# Patient Record
Sex: FEMALE | Race: WHITE | Employment: PART TIME | URBAN - METROPOLITAN AREA
[De-identification: names, ages, dates, MRNs, and addresses within clinical notes are randomized per-mention and may not be internally consistent; named-entity substitution may affect disease eponyms.]

---

## 2017-09-09 ENCOUNTER — HOSPITAL ENCOUNTER (EMERGENCY)
Age: 42
Discharge: HOME | End: 2017-09-09
Attending: EMERGENCY MEDICINE
Payer: SELF-PAY

## 2017-09-09 ENCOUNTER — APPOINTMENT (OUTPATIENT)
Dept: GENERAL RADIOLOGY | Age: 42
End: 2017-09-09
Attending: NURSE PRACTITIONER
Payer: SELF-PAY

## 2017-09-09 VITALS
HEART RATE: 108 BPM | DIASTOLIC BLOOD PRESSURE: 83 MMHG | RESPIRATION RATE: 16 BRPM | SYSTOLIC BLOOD PRESSURE: 135 MMHG | OXYGEN SATURATION: 98 % | WEIGHT: 145 LBS | TEMPERATURE: 98.2 F | BODY MASS INDEX: 30.44 KG/M2 | HEIGHT: 58 IN

## 2017-09-09 DIAGNOSIS — R07.2 SUBSTERNAL CHEST PAIN: Primary | ICD-10-CM

## 2017-09-09 DIAGNOSIS — N30.00 ACUTE CYSTITIS WITHOUT HEMATURIA: ICD-10-CM

## 2017-09-09 LAB
ALBUMIN SERPL-MCNC: 3.7 G/DL (ref 3.5–5)
ALBUMIN/GLOB SERPL: 0.8 {RATIO} (ref 1.2–3.5)
ALP SERPL-CCNC: 80 U/L (ref 50–136)
ALT SERPL-CCNC: 29 U/L (ref 12–65)
AMORPH CRY URNS QL MICRO: ABNORMAL
ANION GAP SERPL CALC-SCNC: 12 MMOL/L (ref 7–16)
AST SERPL-CCNC: 25 U/L (ref 15–37)
BACTERIA URNS QL MICRO: ABNORMAL /HPF
BASOPHILS # BLD: 0.1 K/UL (ref 0–0.2)
BASOPHILS NFR BLD: 0 % (ref 0–2)
BILIRUB SERPL-MCNC: 0.5 MG/DL (ref 0.2–1.1)
BUN SERPL-MCNC: 14 MG/DL (ref 6–23)
CALCIUM SERPL-MCNC: 8.8 MG/DL (ref 8.3–10.4)
CASTS URNS QL MICRO: ABNORMAL /LPF
CHLORIDE SERPL-SCNC: 105 MMOL/L (ref 98–107)
CO2 SERPL-SCNC: 21 MMOL/L (ref 21–32)
CREAT SERPL-MCNC: 0.92 MG/DL (ref 0.6–1)
CRYSTALS URNS QL MICRO: ABNORMAL /LPF
D DIMER PPP FEU-MCNC: 0.2 UG/ML(FEU)
DIFFERENTIAL METHOD BLD: ABNORMAL
EOSINOPHIL # BLD: 0.1 K/UL (ref 0–0.8)
EOSINOPHIL NFR BLD: 1 % (ref 0.5–7.8)
EPI CELLS #/AREA URNS HPF: ABNORMAL /HPF
ERYTHROCYTE [DISTWIDTH] IN BLOOD BY AUTOMATED COUNT: 12.5 % (ref 11.9–14.6)
GLOBULIN SER CALC-MCNC: 4.9 G/DL (ref 2.3–3.5)
GLUCOSE SERPL-MCNC: 140 MG/DL (ref 65–100)
HCT VFR BLD AUTO: 42.8 % (ref 35.8–46.3)
HGB BLD-MCNC: 15.5 G/DL (ref 11.7–15.4)
IMM GRANULOCYTES # BLD: 0 K/UL (ref 0–0.5)
IMM GRANULOCYTES NFR BLD: 0.2 % (ref 0–5)
LYMPHOCYTES # BLD: 3.6 K/UL (ref 0.5–4.6)
LYMPHOCYTES NFR BLD: 32 % (ref 13–44)
MCH RBC QN AUTO: 30.2 PG (ref 26.1–32.9)
MCHC RBC AUTO-ENTMCNC: 36.2 G/DL (ref 31.4–35)
MCV RBC AUTO: 83.4 FL (ref 79.6–97.8)
MONOCYTES # BLD: 0.7 K/UL (ref 0.1–1.3)
MONOCYTES NFR BLD: 6 % (ref 4–12)
MUCOUS THREADS URNS QL MICRO: 0 /LPF
NEUTS SEG # BLD: 7 K/UL (ref 1.7–8.2)
NEUTS SEG NFR BLD: 61 % (ref 43–78)
PLATELET # BLD AUTO: 332 K/UL (ref 150–450)
PMV BLD AUTO: 10 FL (ref 10.8–14.1)
POTASSIUM SERPL-SCNC: 3.6 MMOL/L (ref 3.5–5.1)
PROT SERPL-MCNC: 8.6 G/DL (ref 6.3–8.2)
RBC # BLD AUTO: 5.13 M/UL (ref 4.05–5.25)
RBC #/AREA URNS HPF: 0 /HPF
SODIUM SERPL-SCNC: 138 MMOL/L (ref 136–145)
TROPONIN I BLD-MCNC: 0 NG/ML (ref 0.02–0.05)
TROPONIN I SERPL-MCNC: <0.02 NG/ML (ref 0.02–0.05)
WBC # BLD AUTO: 11.4 K/UL (ref 4.3–11.1)
WBC URNS QL MICRO: ABNORMAL /HPF

## 2017-09-09 PROCEDURE — 99285 EMERGENCY DEPT VISIT HI MDM: CPT | Performed by: NURSE PRACTITIONER

## 2017-09-09 PROCEDURE — 81015 MICROSCOPIC EXAM OF URINE: CPT | Performed by: EMERGENCY MEDICINE

## 2017-09-09 PROCEDURE — 74011250637 HC RX REV CODE- 250/637: Performed by: EMERGENCY MEDICINE

## 2017-09-09 PROCEDURE — 85379 FIBRIN DEGRADATION QUANT: CPT | Performed by: NURSE PRACTITIONER

## 2017-09-09 PROCEDURE — 71020 XR CHEST PA LAT: CPT

## 2017-09-09 PROCEDURE — 93005 ELECTROCARDIOGRAM TRACING: CPT | Performed by: NURSE PRACTITIONER

## 2017-09-09 PROCEDURE — 81003 URINALYSIS AUTO W/O SCOPE: CPT | Performed by: NURSE PRACTITIONER

## 2017-09-09 PROCEDURE — 84484 ASSAY OF TROPONIN QUANT: CPT | Performed by: NURSE PRACTITIONER

## 2017-09-09 PROCEDURE — 80053 COMPREHEN METABOLIC PANEL: CPT | Performed by: NURSE PRACTITIONER

## 2017-09-09 PROCEDURE — 85025 COMPLETE CBC W/AUTO DIFF WBC: CPT | Performed by: NURSE PRACTITIONER

## 2017-09-09 RX ORDER — SULFAMETHOXAZOLE AND TRIMETHOPRIM 800; 160 MG/1; MG/1
1 TABLET ORAL 2 TIMES DAILY
Qty: 6 TAB | Refills: 0 | Status: SHIPPED | OUTPATIENT
Start: 2017-09-09 | End: 2017-09-12

## 2017-09-09 RX ADMIN — Medication 30 ML: at 18:46

## 2017-09-09 NOTE — ED PROVIDER NOTES
HPI Comments: 57-year-old female with history of hypertension presents with complaint of dull, aching substernal chest pain w/o radiation that began around 11 AM. Denies shortness of breath, nausea, vomiting, dizziness, weakness, fever, chills, cough, LE edema/pain, hemoptysis. Reports positive family history for CAD. States that she underwent cardiac stress tests and GHS 5-6 years ago that was within normal limits. States she has an allergy to aspirin and cannot take at this time. Patient is a 38 y.o. female presenting with chest pain. The history is provided by the patient. No  was used. Chest Pain (Angina)    This is a new problem. The current episode started 6 to 12 hours ago. The problem has been resolved. The problem occurs constantly. The pain is associated with exertion. The pain is present in the substernal region. The pain is at a severity of 2/10. The pain is mild. The quality of the pain is described as sharp. The pain radiates to the left shoulder. Associated symptoms include nausea. Pertinent negatives include no abdominal pain, no back pain, no claudication, no cough, no diaphoresis, no dizziness, no exertional chest pressure, no fever, no headaches, no hemoptysis, no leg pain, no lower extremity edema, no malaise/fatigue, no near-syncope, no numbness, no orthopnea, no palpitations, no shortness of breath, no vomiting and no weakness. She has tried nothing for the symptoms. The treatment provided no relief. Risk factors include family history, diabetes mellitus and hypertension. Her past medical history is significant for HTN. Her past medical history does not include DM, DVT or PE. Procedural history includes exercise treadmill test.       Past Medical History:   Diagnosis Date    Hypertension        Past Surgical History:   Procedure Laterality Date    HX GYN      c-sec x2; hysterectomy         History reviewed. No pertinent family history.     Social History     Social History    Marital status:      Spouse name: N/A    Number of children: N/A    Years of education: N/A     Occupational History    Not on file. Social History Main Topics    Smoking status: Never Smoker    Smokeless tobacco: Never Used    Alcohol use No    Drug use: No    Sexual activity: Yes     Birth control/ protection: Surgical     Other Topics Concern    Not on file     Social History Narrative         ALLERGIES: Aspirin    Review of Systems   Constitutional: Negative for chills, diaphoresis, fatigue, fever and malaise/fatigue. HENT: Negative for congestion and rhinorrhea. Eyes: Negative for pain and redness. Respiratory: Negative for cough, hemoptysis and shortness of breath. Cardiovascular: Positive for chest pain. Negative for palpitations, orthopnea, claudication, leg swelling and near-syncope. Gastrointestinal: Positive for nausea. Negative for abdominal distention, abdominal pain, anal bleeding, blood in stool, diarrhea and vomiting. Endocrine: Negative for polydipsia and polyphagia. Genitourinary: Negative for dysuria, flank pain, genital sores, hematuria, pelvic pain, vaginal bleeding and vaginal discharge. Musculoskeletal: Negative for back pain, gait problem, joint swelling, neck pain and neck stiffness. Skin: Negative for pallor and rash. Neurological: Negative for dizziness, seizures, syncope, weakness, numbness and headaches. Psychiatric/Behavioral: Negative for agitation and confusion. Vitals:    09/09/17 1556 09/09/17 1712 09/09/17 1741   BP: (!) 174/112 136/75 137/89   Pulse: (!) 108     Resp: 26     Temp: 98 °F (36.7 °C)     SpO2: 97% 98% 99%   Weight: 65.8 kg (145 lb)     Height: 4' 10\" (1.473 m)              Physical Exam   Constitutional: She is oriented to person, place, and time. She appears well-developed and well-nourished. No distress. HENT:   Head: Normocephalic and atraumatic.    Mouth/Throat: Oropharynx is clear and moist. No oropharyngeal exudate. Eyes: Conjunctivae and EOM are normal. Pupils are equal, round, and reactive to light. Neck: Normal range of motion. No JVD present. No tracheal deviation present. Cardiovascular: Normal rate, regular rhythm and intact distal pulses. No murmur heard. Radial pulses 2+ and equal bilaterally    Pulmonary/Chest: Effort normal and breath sounds normal. No respiratory distress. She has no wheezes. She has no rales. She exhibits no tenderness. Abdominal: Soft. Bowel sounds are normal. She exhibits no distension and no mass. There is no tenderness. There is no rebound and no guarding. Musculoskeletal: Normal range of motion. She exhibits no edema, tenderness or deformity. No calf tenderness present bilaterally   Neurological: She is alert and oriented to person, place, and time. No cranial nerve deficit. Coordination normal.   Skin: Skin is warm and dry. No rash noted. No erythema. No pallor. Psychiatric: She has a normal mood and affect. Her behavior is normal.   Nursing note and vitals reviewed. MDM  Number of Diagnoses or Management Options  Substernal chest pain: new and requires workup     Amount and/or Complexity of Data Reviewed  Clinical lab tests: ordered and reviewed  Tests in the radiology section of CPT®: ordered and reviewed  Tests in the medicine section of CPT®: ordered and reviewed  Review and summarize past medical records: yes  Discuss the patient with other providers: yes  Independent visualization of images, tracings, or specimens: yes    Risk of Complications, Morbidity, and/or Mortality  Presenting problems: moderate  Diagnostic procedures: moderate  Management options: moderate      ED Course   Comment By Time   CXR IMPRESSION:  1. No acute abnormality. Jared Howard MD 09/09 1726   Vital signs stable. Initial and repeat troponin within normal limits. D-dimer within normal limits. EKG with no acute findings. chest x-ray clear.  We'll discharge with close follow with cardiology. Will contact the [facility] cardiology referral line to have patient seen within the next week. Instructed patient to take aspirin daily.  Yamilex Sorto MD 09/09 1931       EKG  Date/Time: 9/9/2017 5:32 PM  Performed by: Donnel Collet  Authorized by: KALE Gaspar     Rate:     ECG rate:  105    ECG rate assessment: tachycardic    Rhythm:     Rhythm: sinus tachycardia    Ectopy:     Ectopy: none    QRS:     QRS axis:  Normal    QRS intervals:  Normal  Conduction:     Conduction: normal    ST segments:     ST segments:  Normal  T waves:     T waves: normal

## 2017-09-09 NOTE — ED TRIAGE NOTES
Roc Plaza is a 38 y.o. female here for chest pain that radiates into her left arm. She states pain woke her this morning around 0430. She states she though she had heart burn but burning sensation has stopped and now she just has chest pain. She states some nausea but denies vomiting. She states feels like she cannot get a deep breath. She is currently crying while in triage. . Rapid assessment performed. --- Orders were placed.     Signed By: UMM Ma     September 9, 2017

## 2017-09-09 NOTE — LETTER
[address] [facility] EMERGENCY DEPT [address] [address] 
[phone] Work/School Note Date: 9/9/2017 To Whom It May concern: 
 
Leena Galeano was seen and treated today in the emergency room by the following provider(s): 
Attending Provider: Jose Damon MD.   
 
Leena Galeano may return to work on 09/10/2017. Sincerely, Vicky Montiel RN

## 2017-09-09 NOTE — ED TRIAGE NOTES
Pt states chest pain that woke her up this morning along with her left shoulder. Pt states when she breaths in she feels as if she cannot get a full breath.

## 2017-09-09 NOTE — DISCHARGE INSTRUCTIONS
Urinary Tract Infection in Women: Care Instructions  Your Care Instructions    A urinary tract infection, or UTI, is a general term for an infection anywhere between the kidneys and the urethra (where urine comes out). Most UTIs are bladder infections. They often cause pain or burning when you urinate. UTIs are caused by bacteria and can be cured with antibiotics. Be sure to complete your treatment so that the infection goes away. Follow-up care is a key part of your treatment and safety. Be sure to make and go to all appointments, and call your doctor if you are having problems. It's also a good idea to know your test results and keep a list of the medicines you take. How can you care for yourself at home? · Take your antibiotics as directed. Do not stop taking them just because you feel better. You need to take the full course of antibiotics. · Drink extra water and other fluids for the next day or two. This may help wash out the bacteria that are causing the infection. (If you have kidney, heart, or liver disease and have to limit fluids, talk with your doctor before you increase your fluid intake.)  · Avoid drinks that are carbonated or have caffeine. They can irritate the bladder. · Urinate often. Try to empty your bladder each time. · To relieve pain, take a hot bath or lay a heating pad set on low over your lower belly or genital area. Never go to sleep with a heating pad in place. To prevent UTIs  · Drink plenty of water each day. This helps you urinate often, which clears bacteria from your system. (If you have kidney, heart, or liver disease and have to limit fluids, talk with your doctor before you increase your fluid intake.)  · Urinate when you need to. · Urinate right after you have sex. · Change sanitary pads often. · Avoid douches, bubble baths, feminine hygiene sprays, and other feminine hygiene products that have deodorants.   · After going to the bathroom, wipe from front to back.  When should you call for help? Call your doctor now or seek immediate medical care if:  · Symptoms such as fever, chills, nausea, or vomiting get worse or appear for the first time. · You have new pain in your back just below your rib cage. This is called flank pain. · There is new blood or pus in your urine. · You have any problems with your antibiotic medicine. Watch closely for changes in your health, and be sure to contact your doctor if:  · You are not getting better after taking an antibiotic for 2 days. · Your symptoms go away but then come back. Where can you learn more? Go to http://cassandra-anna.info/. Enter W366 in the search box to learn more about \"Urinary Tract Infection in Women: Care Instructions. \"  Current as of: November 28, 2016  Content Version: 11.3  © 7310-4100 [employer]. Care instructions adapted under license by [employer] (which disclaims liability or warranty for this information). If you have questions about a medical condition or this instruction, always ask your healthcare professional. [address] any warranty or liability for your use of this information. Chest Pain: Care Instructions  Your Care Instructions  There are many things that can cause chest pain. Some are not serious and will get better on their own in a few days. But some kinds of chest pain need more testing and treatment. Your doctor may have recommended a follow-up visit in the next 8 to 12 hours. If you are not getting better, you may need more tests or treatment. Even though your doctor has released you, you still need to watch for any problems. The doctor carefully checked you, but sometimes problems can develop later. If you have new symptoms or if your symptoms do not get better, get medical care right away.   If you have worse or different chest pain or pressure that lasts more than 5 minutes or you passed out (lost consciousness), call 911 or seek other emergency help right away. A medical visit is only one step in your treatment. Even if you feel better, you still need to do what your doctor recommends, such as going to all suggested follow-up appointments and taking medicines exactly as directed. This will help you recover and help prevent future problems. How can you care for yourself at home? · Rest until you feel better. · Take your medicine exactly as prescribed. Call your doctor if you think you are having a problem with your medicine. · Do not drive after taking a prescription pain medicine. When should you call for help? Call 911 if:  · You passed out (lost consciousness). · You have severe difficulty breathing. · You have symptoms of a heart attack. These may include:  ¨ Chest pain or pressure, or a strange feeling in your chest.  ¨ Sweating. ¨ Shortness of breath. ¨ Nausea or vomiting. ¨ Pain, pressure, or a strange feeling in your back, neck, jaw, or upper belly or in one or both shoulders or arms. ¨ Lightheadedness or sudden weakness. ¨ A fast or irregular heartbeat. After you call 911, the  may tell you to chew 1 adult-strength or 2 to 4 low-dose aspirin. Wait for an ambulance. Do not try to drive yourself. Call your doctor today if:  · You have any trouble breathing. · Your chest pain gets worse. · You are dizzy or lightheaded, or you feel like you may faint. · You are not getting better as expected. · You are having new or different chest pain. Where can you learn more? Go to http://cassandra-anna.info/. Enter A120 in the search box to learn more about \"Chest Pain: Care Instructions. \"  Current as of: March 20, 2017  Content Version: 11.3  © 5550-0927 [employer]. Care instructions adapted under license by [employer] (which disclaims liability or warranty for this information).  If you have questions about a medical condition or this instruction, always ask your healthcare professional. [place] [address] any warranty or liability for your use of this information.

## 2017-09-10 LAB
ATRIAL RATE: 105 BPM
CALCULATED P AXIS, ECG09: 40 DEGREES
CALCULATED R AXIS, ECG10: 21 DEGREES
CALCULATED T AXIS, ECG11: 37 DEGREES
DIAGNOSIS, 93000: NORMAL
P-R INTERVAL, ECG05: 132 MS
Q-T INTERVAL, ECG07: 324 MS
QRS DURATION, ECG06: 72 MS
QTC CALCULATION (BEZET), ECG08: 428 MS
VENTRICULAR RATE, ECG03: 105 BPM

## 2017-10-26 ENCOUNTER — HOSPITAL ENCOUNTER (INPATIENT)
Age: 42
LOS: 2 days | Discharge: HOME | DRG: 872 | End: 2017-10-28
Attending: EMERGENCY MEDICINE | Admitting: INTERNAL MEDICINE
Payer: SELF-PAY

## 2017-10-26 ENCOUNTER — APPOINTMENT (OUTPATIENT)
Dept: GENERAL RADIOLOGY | Age: 42
DRG: 872 | End: 2017-10-26
Attending: EMERGENCY MEDICINE
Payer: SELF-PAY

## 2017-10-26 DIAGNOSIS — J20.9 ACUTE BRONCHITIS, UNSPECIFIED ORGANISM: ICD-10-CM

## 2017-10-26 DIAGNOSIS — A41.9 SEPSIS, DUE TO UNSPECIFIED ORGANISM: Primary | ICD-10-CM

## 2017-10-26 LAB
ALBUMIN SERPL-MCNC: 3.3 G/DL (ref 3.5–5)
ALBUMIN/GLOB SERPL: 0.7 {RATIO} (ref 1.2–3.5)
ALP SERPL-CCNC: 96 U/L (ref 50–136)
ALT SERPL-CCNC: 39 U/L (ref 12–65)
ANION GAP SERPL CALC-SCNC: 12 MMOL/L (ref 7–16)
APPEARANCE UR: CLEAR
AST SERPL-CCNC: 41 U/L (ref 15–37)
BACTERIA URNS QL MICRO: ABNORMAL /HPF
BACTERIA URNS QL MICRO: ABNORMAL /HPF
BASOPHILS # BLD: 0 K/UL (ref 0–0.2)
BASOPHILS NFR BLD: 0 % (ref 0–2)
BILIRUB SERPL-MCNC: 0.9 MG/DL (ref 0.2–1.1)
BILIRUB UR QL: NEGATIVE
BUN SERPL-MCNC: 8 MG/DL (ref 6–23)
CALCIUM SERPL-MCNC: 8.6 MG/DL (ref 8.3–10.4)
CASTS URNS QL MICRO: 0 /LPF
CASTS URNS QL MICRO: ABNORMAL /LPF
CHLORIDE SERPL-SCNC: 99 MMOL/L (ref 98–107)
CO2 SERPL-SCNC: 23 MMOL/L (ref 21–32)
COLOR UR: YELLOW
CREAT SERPL-MCNC: 0.9 MG/DL (ref 0.6–1)
CRYSTALS URNS QL MICRO: 0 /LPF
DEPRECATED S PYO AG THROAT QL EIA: NEGATIVE
DIFFERENTIAL METHOD BLD: ABNORMAL
EOSINOPHIL # BLD: 0 K/UL (ref 0–0.8)
EOSINOPHIL NFR BLD: 0 % (ref 0.5–7.8)
EPI CELLS #/AREA URNS HPF: ABNORMAL /HPF
EPI CELLS #/AREA URNS HPF: ABNORMAL /HPF
ERYTHROCYTE [DISTWIDTH] IN BLOOD BY AUTOMATED COUNT: 12.6 % (ref 11.9–14.6)
FLUAV AG NPH QL IA: NEGATIVE
FLUBV AG NPH QL IA: NEGATIVE
GLOBULIN SER CALC-MCNC: 4.9 G/DL (ref 2.3–3.5)
GLUCOSE SERPL-MCNC: 214 MG/DL (ref 65–100)
GLUCOSE UR STRIP.AUTO-MCNC: >1000 MG/DL
HCT VFR BLD AUTO: 41.3 % (ref 35.8–46.3)
HGB BLD-MCNC: 14.4 G/DL (ref 11.7–15.4)
HGB UR QL STRIP: ABNORMAL
IMM GRANULOCYTES # BLD: 0.1 K/UL (ref 0–0.5)
IMM GRANULOCYTES NFR BLD: 1 % (ref 0–5)
KETONES UR QL STRIP.AUTO: >80 MG/DL
LACTATE BLD-SCNC: 2.4 MMOL/L (ref 0.5–1.9)
LACTATE SERPL-SCNC: 2.5 MMOL/L (ref 0.4–2)
LEUKOCYTE ESTERASE UR QL STRIP.AUTO: NEGATIVE
LYMPHOCYTES # BLD: 1.5 K/UL (ref 0.5–4.6)
LYMPHOCYTES NFR BLD: 10 % (ref 13–44)
MAGNESIUM SERPL-MCNC: 2 MG/DL (ref 1.8–2.4)
MCH RBC QN AUTO: 30 PG (ref 26.1–32.9)
MCHC RBC AUTO-ENTMCNC: 34.9 G/DL (ref 31.4–35)
MCV RBC AUTO: 86 FL (ref 79.6–97.8)
MONOCYTES # BLD: 0.8 K/UL (ref 0.1–1.3)
MONOCYTES NFR BLD: 5 % (ref 4–12)
MUCOUS THREADS URNS QL MICRO: ABNORMAL /LPF
NEUTS SEG # BLD: 12.9 K/UL (ref 1.7–8.2)
NEUTS SEG NFR BLD: 84 % (ref 43–78)
NITRITE UR QL STRIP.AUTO: NEGATIVE
OTHER OBSERVATIONS,UCOM: ABNORMAL
PH UR STRIP: 5.5 [PH] (ref 5–9)
PHOSPHATE SERPL-MCNC: 1.8 MG/DL (ref 2.5–4.5)
PLATELET # BLD AUTO: 245 K/UL (ref 150–450)
PMV BLD AUTO: 10.1 FL (ref 10.8–14.1)
POTASSIUM SERPL-SCNC: 3.3 MMOL/L (ref 3.5–5.1)
PROCALCITONIN SERPL-MCNC: 0.3 NG/ML
PROT SERPL-MCNC: 8.2 G/DL (ref 6.3–8.2)
PROT UR STRIP-MCNC: ABNORMAL MG/DL
RBC # BLD AUTO: 4.8 M/UL (ref 4.05–5.25)
RBC #/AREA URNS HPF: ABNORMAL /HPF
RBC #/AREA URNS HPF: ABNORMAL /HPF
SODIUM SERPL-SCNC: 134 MMOL/L (ref 136–145)
SP GR UR REFRACTOMETRY: 1.04 (ref 1–1.02)
UROBILINOGEN UR QL STRIP.AUTO: 0.2 EU/DL (ref 0.2–1)
WBC # BLD AUTO: 15.3 K/UL (ref 4.3–11.1)
WBC URNS QL MICRO: ABNORMAL /HPF
WBC URNS QL MICRO: ABNORMAL /HPF

## 2017-10-26 PROCEDURE — 83605 ASSAY OF LACTIC ACID: CPT | Performed by: INTERNAL MEDICINE

## 2017-10-26 PROCEDURE — 74011000250 HC RX REV CODE- 250

## 2017-10-26 PROCEDURE — 74011250636 HC RX REV CODE- 250/636: Performed by: INTERNAL MEDICINE

## 2017-10-26 PROCEDURE — 94640 AIRWAY INHALATION TREATMENT: CPT

## 2017-10-26 PROCEDURE — 65270000029 HC RM PRIVATE

## 2017-10-26 PROCEDURE — 71020 XR CHEST PA LAT: CPT

## 2017-10-26 PROCEDURE — 65660000000 HC RM CCU STEPDOWN

## 2017-10-26 PROCEDURE — 36415 COLL VENOUS BLD VENIPUNCTURE: CPT | Performed by: INTERNAL MEDICINE

## 2017-10-26 PROCEDURE — 93005 ELECTROCARDIOGRAM TRACING: CPT | Performed by: INTERNAL MEDICINE

## 2017-10-26 PROCEDURE — 74011000250 HC RX REV CODE- 250: Performed by: EMERGENCY MEDICINE

## 2017-10-26 PROCEDURE — 87880 STREP A ASSAY W/OPTIC: CPT | Performed by: EMERGENCY MEDICINE

## 2017-10-26 PROCEDURE — 87081 CULTURE SCREEN ONLY: CPT | Performed by: EMERGENCY MEDICINE

## 2017-10-26 PROCEDURE — 85025 COMPLETE CBC W/AUTO DIFF WBC: CPT | Performed by: EMERGENCY MEDICINE

## 2017-10-26 PROCEDURE — 96365 THER/PROPH/DIAG IV INF INIT: CPT | Performed by: EMERGENCY MEDICINE

## 2017-10-26 PROCEDURE — 96361 HYDRATE IV INFUSION ADD-ON: CPT | Performed by: EMERGENCY MEDICINE

## 2017-10-26 PROCEDURE — 83605 ASSAY OF LACTIC ACID: CPT

## 2017-10-26 PROCEDURE — 84145 PROCALCITONIN (PCT): CPT | Performed by: EMERGENCY MEDICINE

## 2017-10-26 PROCEDURE — 87086 URINE CULTURE/COLONY COUNT: CPT | Performed by: EMERGENCY MEDICINE

## 2017-10-26 PROCEDURE — 74011250637 HC RX REV CODE- 250/637: Performed by: EMERGENCY MEDICINE

## 2017-10-26 PROCEDURE — 81015 MICROSCOPIC EXAM OF URINE: CPT | Performed by: EMERGENCY MEDICINE

## 2017-10-26 PROCEDURE — 81001 URINALYSIS AUTO W/SCOPE: CPT | Performed by: INTERNAL MEDICINE

## 2017-10-26 PROCEDURE — 80053 COMPREHEN METABOLIC PANEL: CPT | Performed by: EMERGENCY MEDICINE

## 2017-10-26 PROCEDURE — 99285 EMERGENCY DEPT VISIT HI MDM: CPT | Performed by: EMERGENCY MEDICINE

## 2017-10-26 PROCEDURE — 96375 TX/PRO/DX INJ NEW DRUG ADDON: CPT | Performed by: EMERGENCY MEDICINE

## 2017-10-26 PROCEDURE — 87040 BLOOD CULTURE FOR BACTERIA: CPT | Performed by: EMERGENCY MEDICINE

## 2017-10-26 PROCEDURE — 74011250636 HC RX REV CODE- 250/636: Performed by: EMERGENCY MEDICINE

## 2017-10-26 PROCEDURE — 84100 ASSAY OF PHOSPHORUS: CPT | Performed by: INTERNAL MEDICINE

## 2017-10-26 PROCEDURE — 83735 ASSAY OF MAGNESIUM: CPT | Performed by: INTERNAL MEDICINE

## 2017-10-26 PROCEDURE — 87804 INFLUENZA ASSAY W/OPTIC: CPT | Performed by: EMERGENCY MEDICINE

## 2017-10-26 RX ORDER — DEXAMETHASONE SODIUM PHOSPHATE 100 MG/10ML
8 INJECTION INTRAMUSCULAR; INTRAVENOUS
Status: COMPLETED | OUTPATIENT
Start: 2017-10-26 | End: 2017-10-26

## 2017-10-26 RX ORDER — ACETAMINOPHEN 325 MG/1
650 TABLET ORAL
Status: DISCONTINUED | OUTPATIENT
Start: 2017-10-26 | End: 2017-10-28 | Stop reason: HOSPADM

## 2017-10-26 RX ORDER — ACETAMINOPHEN 500 MG
1000 TABLET ORAL
Status: COMPLETED | OUTPATIENT
Start: 2017-10-26 | End: 2017-10-26

## 2017-10-26 RX ORDER — SODIUM CHLORIDE 0.9 % (FLUSH) 0.9 %
5 SYRINGE (ML) INJECTION EVERY 8 HOURS
Status: DISCONTINUED | OUTPATIENT
Start: 2017-10-26 | End: 2017-10-28 | Stop reason: HOSPADM

## 2017-10-26 RX ORDER — SODIUM CHLORIDE 0.9 % (FLUSH) 0.9 %
5-10 SYRINGE (ML) INJECTION AS NEEDED
Status: DISCONTINUED | OUTPATIENT
Start: 2017-10-26 | End: 2017-10-28 | Stop reason: HOSPADM

## 2017-10-26 RX ORDER — ALBUTEROL SULFATE 0.83 MG/ML
5 SOLUTION RESPIRATORY (INHALATION)
Status: COMPLETED | OUTPATIENT
Start: 2017-10-26 | End: 2017-10-26

## 2017-10-26 RX ORDER — KETOROLAC TROMETHAMINE 30 MG/ML
30 INJECTION, SOLUTION INTRAMUSCULAR; INTRAVENOUS
Status: COMPLETED | OUTPATIENT
Start: 2017-10-26 | End: 2017-10-26

## 2017-10-26 RX ORDER — IPRATROPIUM BROMIDE AND ALBUTEROL SULFATE 2.5; .5 MG/3ML; MG/3ML
SOLUTION RESPIRATORY (INHALATION)
Status: COMPLETED
Start: 2017-10-26 | End: 2017-10-26

## 2017-10-26 RX ORDER — SODIUM CHLORIDE 9 MG/ML
100 INJECTION, SOLUTION INTRAVENOUS CONTINUOUS
Status: DISCONTINUED | OUTPATIENT
Start: 2017-10-26 | End: 2017-10-28 | Stop reason: HOSPADM

## 2017-10-26 RX ORDER — SODIUM CHLORIDE 9 MG/ML
500 INJECTION, SOLUTION INTRAVENOUS ONCE
Status: COMPLETED | OUTPATIENT
Start: 2017-10-26 | End: 2017-10-26

## 2017-10-26 RX ORDER — LEVOFLOXACIN 5 MG/ML
750 INJECTION, SOLUTION INTRAVENOUS
Status: COMPLETED | OUTPATIENT
Start: 2017-10-26 | End: 2017-10-26

## 2017-10-26 RX ORDER — ENOXAPARIN SODIUM 100 MG/ML
40 INJECTION SUBCUTANEOUS EVERY 24 HOURS
Status: DISCONTINUED | OUTPATIENT
Start: 2017-10-26 | End: 2017-10-28 | Stop reason: HOSPADM

## 2017-10-26 RX ADMIN — IPRATROPIUM BROMIDE AND ALBUTEROL SULFATE 3 ML: .5; 3 SOLUTION RESPIRATORY (INHALATION) at 15:30

## 2017-10-26 RX ADMIN — DEXAMETHASONE SODIUM PHOSPHATE 8 MG: 10 INJECTION INTRAMUSCULAR; INTRAVENOUS at 16:08

## 2017-10-26 RX ADMIN — LEVOFLOXACIN 750 MG: 5 INJECTION, SOLUTION INTRAVENOUS at 16:04

## 2017-10-26 RX ADMIN — SODIUM CHLORIDE 500 ML: 900 INJECTION, SOLUTION INTRAVENOUS at 17:19

## 2017-10-26 RX ADMIN — Medication 10 ML: at 22:45

## 2017-10-26 RX ADMIN — ALBUTEROL SULFATE 5 MG: 2.5 SOLUTION RESPIRATORY (INHALATION) at 15:30

## 2017-10-26 RX ADMIN — KETOROLAC TROMETHAMINE 30 MG: 30 INJECTION, SOLUTION INTRAMUSCULAR at 17:19

## 2017-10-26 RX ADMIN — SODIUM CHLORIDE 1905 ML: 900 INJECTION, SOLUTION INTRAVENOUS at 15:48

## 2017-10-26 RX ADMIN — ENOXAPARIN SODIUM 40 MG: 40 INJECTION SUBCUTANEOUS at 20:25

## 2017-10-26 RX ADMIN — ACETAMINOPHEN 1000 MG: 500 TABLET, FILM COATED ORAL at 13:15

## 2017-10-26 RX ADMIN — Medication 10 ML: at 20:29

## 2017-10-26 RX ADMIN — SODIUM CHLORIDE 150 ML/HR: 900 INJECTION, SOLUTION INTRAVENOUS at 20:26

## 2017-10-26 NOTE — IP AVS SNAPSHOT
Summary of Care Report The Summary of Care report has been created to help improve care coordination. Users with access to [employer] or [address] (Web-based application) may access additional patient information including the Discharge Summary. If you are not currently a [address] user and need more information, please call the number listed below in the [place] [address] section and ask to be connected with Medical Records. Facility Information Name Address Phone [address] [address] [address] [phone] Patient Information Patient Name Sex  Wellington Mandujano ([id number]) Female 1975 Discharge Information Admitting Provider Service Area Unit [facility] [address] / [address] Surgical / [phone] Discharge Provider Discharge Date/Time Discharge Disposition Destination (none) 10/28/2017 (Pending) AHR (none) Patient Language Language ENGLISH [13] Hospital Problems as of 10/28/2017  Never Reviewed Class Noted - Resolved Last Modified POA Active Problems DM type 2 (diabetes mellitus, type 2) ([facility] [address]  10/27/2017 - Present 10/27/2017 by Iman Dhaliwal MD Yes Entered by Iman Dhaliwal MD  
  Essential hypertension  10/27/2017 - Present 10/27/2017 by Iman Dhaliwal MD Yes Entered by Iamn Dhaliwal MD  
  Strep throat  10/28/2017 - Present 10/28/2017 by Iman Dhaliwal MD Yes Entered by Iman Dhaliwal MD  
  
You are allergic to the following Allergen Reactions Aspirin Other (comments) Unknown reaction Current Discharge Medication List  
  
START taking these medications Dose & Instructions Dispensing Information Comments  
 amoxicillin-clavulanate 500-125 mg per tablet Commonly known as:  AUGMENTIN Dose:  1 Tab Take 1 Tab by mouth every twelve (12) hours for 30 days. Quantity:  60 Tab Refills:  0 Blood-Glucose Meter monitoring kit Check blood glucose at least once a day, Quantity:  1 Kit Refills:  0  
   
 glucose blood VI test strips strip Commonly known as:  ASCENSIA AUTODISC VI, ONE TOUCH ULTRA TEST VI  
 1 strip daily Quantity:  50 Strip Refills:  0  
   
 lisinopril 10 mg tablet Commonly known as:  Rosana Castaneda Start taking on:  10/29/2017 Dose:  10 mg Take 1 Tab by mouth daily for 30 days. Quantity:  30 Tab Refills:  0  
   
 metFORMIN 1,000 mg tablet Commonly known as:  GLUCOPHAGE Dose:  1000 mg Take 1 Tab by mouth two (2) times daily (with meals) for 30 days. Quantity:  60 Tab Refills:  0 Current Immunizations Name Date Influenza Vaccine (Quad) PF 10/28/2017 Follow-up Information Follow up With Details Comments Contact Info Provider Unknown In 5 days follow up with PCP in 3-5 days Patient not available to ask Discharge Instructions DISCHARGE SUMMARY from Nurse PATIENT INSTRUCTIONS: 
 
 
F-face looks uneven A-arms unable to move or move unevenly S-speech slurred or non-existent T-time-call 911 as soon as signs and symptoms begin-DO NOT go Back to bed or wait to see if you get better-TIME IS BRAIN. Warning Signs of HEART ATTACK Call 911 if you have these symptoms: 
? Chest discomfort. Most heart attacks involve discomfort in the center of the chest that lasts more than a few minutes, or that goes away and comes back. It can feel like uncomfortable pressure, squeezing, fullness, or pain. ? Discomfort in other areas of the upper body. Symptoms can include pain or discomfort in one or both arms, the back, neck, jaw, or stomach. ? Shortness of breath with or without chest discomfort. ? Other signs may include breaking out in a cold sweat, nausea, or lightheadedness. Don't wait more than five minutes to call [address]! Fast action can save your life. Calling 911 is almost always the fastest way to get lifesaving treatment. Emergency Medical Services staff can begin treatment when they arrive  up to an hour sooner than if someone gets to the hospital by car. The discharge information has been reviewed with the patient. The patient verbalized understanding. Discharge medications reviewed with the patient and appropriate educational materials and side effects teaching were provided. Sepsis: Care Instructions Your Care Instructions Sepsis is an infection that has spread throughout your body. It is a life-threatening condition and often causes extremely low blood pressure. This can lead to problems with many different organs. The cause of sepsis is not always clear, but it can happen as part of a long-term or sudden illness. Sometimes even a mild illness can lead to sepsis. Follow-up care is a key part of your treatment and safety. Be sure to make and go to all appointments, and call your doctor if you are having problems. It's also a good idea to know your test results and keep a list of the medicines you take. How can you care for yourself at home? · If your doctor prescribed antibiotics, take them as directed. Do not stop taking them just because you feel better. You need to take the full course of antibiotics. · Drink plenty of fluids, enough so that your urine is light yellow or clear like water. Choose water or caffeine-free clear liquids until you feel better.  If you have kidney, heart, or liver disease and have to limit fluids, talk with your doctor before you increase your fluid intake. You can try rehydration drinks, such as Gatorade or Powerade. · Do not drink alcohol. · Eat a healthy diet. Include fruits, vegetables, and whole grains in your diet every day. · Walking is an easy way to get exercise. Gradually increase the amount you walk every day. Make sure your doctor knows that you are starting an exercise program. 
· Do not smoke or use other tobacco products. If you need help quitting, talk to your doctor about stop-smoking programs and medicines. These can increase your chances of quitting for good. When should you call for help? Call 911 anytime you think you may need emergency care. For example, call if: 
? · You passed out (lost consciousness). ?Call your doctor now or seek immediate medical care if: 
? · You have a fever or chills. ? · You have cool, pale, or clammy skin. ? · You are dizzy or lightheaded, or you feel like you may faint. ? · You have any new symptoms, such as a cough, pain in one part of your body, or urinary problems. ? Watch closely for changes in your health, and be sure to contact your doctor if: 
? · You do not get better as expected. Where can you learn more? Go to http://cassandra-anna.info/. Enter E827 in the search box to learn more about \"Sepsis: Care Instructions. \" Current as of: March 20, 2017 Content Version: 11.4 © 4980-2103 [employer]. Care instructions adapted under license by [employer] (which disclaims liability or warranty for this information). If you have questions about a medical condition or this instruction, always ask your healthcare professional. [place] [address] any warranty or liability for your use of this information. Strep Throat: Care Instructions Your Care Instructions Strep throat is a bacterial infection that causes sudden, severe sore throat and fever. Strep throat, which is caused by bacteria called streptococcus, is treated with antibiotics. Sometimes a strep test is necessary to tell if the sore throat is caused by strep bacteria. Treatment can help ease symptoms and may prevent future problems. Follow-up care is a key part of your treatment and safety. Be sure to make and go to all appointments, and call your doctor if you are having problems. It's also a good idea to know your test results and keep a list of the medicines you take. How can you care for yourself at home? · Take your antibiotics as directed. Do not stop taking them just because you feel better. You need to take the full course of antibiotics. · Strep throat can spread to others until 24 hours after you begin taking antibiotics. During this time, you should avoid contact with other people at work or home, especially infants and children. Do not sneeze or cough on others, and wash your hands often. Keep your drinking glass and eating utensils separate from those of others, and wash these items well in hot, soapy water. · Gargle with warm salt water at least once each hour to help reduce swelling and make your throat feel better. Use 1 teaspoon of salt mixed in 8 fluid ounces of warm water. · Take an over-the-counter pain medication, such as acetaminophen (Tylenol), ibuprofen (Advil, Motrin), or naproxen (Aleve). Read and follow all instructions on the label. · Try an over-the-counter anesthetic throat spray or throat lozenges, which may help relieve throat pain. · Drink plenty of fluids. Fluids may help soothe an irritated throat. Hot fluids, such as tea or soup, may help your throat feel better. · Eat soft solids and drink plenty of clear liquids. Flavored ice pops, ice cream, scrambled eggs, sherbet, and gelatin dessert (such as Jell-O) may also soothe the throat. · Get lots of rest. 
· Do not smoke, and avoid secondhand smoke.  If you need help quitting, talk to your doctor about stop-smoking programs and medicines. These can increase your chances of quitting for good. · Use a vaporizer or humidifier to add moisture to the air in your bedroom. Follow the directions for cleaning the machine. When should you call for help? Call your doctor now or seek immediate medical care if: 
? · You have a new or higher fever. ? · You have a fever with a stiff neck or severe headache. ? · You have new or worse trouble swallowing. ? · Your sore throat gets much worse on one side. ? · Your pain becomes much worse on one side of your throat. ? Watch closely for changes in your health, and be sure to contact your doctor if: 
? · You are not getting better after 2 days (48 hours). ? · You do not get better as expected. Where can you learn more? Go to http://cassandra-anna.info/. Enter K625 in the search box to learn more about \"Strep Throat: Care Instructions. \" Current as of: May 12, 2017 Content Version: 11.4 © 2059-3390 [employer]. Care instructions adapted under license by [employer] (which disclaims liability or warranty for this information). If you have questions about a medical condition or this instruction, always ask your healthcare professional. [address] any warranty or liability for your use of this information. ___________________________________________________________________________________________________________________________________ Chart Review Routing History No Routing History on File

## 2017-10-26 NOTE — ED NOTES
TRANSFER - OUT REPORT:    Verbal report given to Mana Stanton on Gae Peak  being transferred to [facility] for routine progression of care       Report consisted of patients Situation, Background, Assessment and   Recommendations(SBAR). Information from the following report(s) SBAR was reviewed with the receiving nurse. Lines:   Peripheral IV 10/26/17 Left Antecubital (Active)        Opportunity for questions and clarification was provided.

## 2017-10-26 NOTE — IP AVS SNAPSHOT
Merlin Car 
 
 
 [address] [facility] [address] [facility] 
[phone] Patient: Lynnette Arita MRN: [id number] PWS:[id number] My Medications TAKE these medications as instructed Instructions Each Dose to Equal  
 Morning Noon Evening Bedtime  
 amoxicillin-clavulanate 500-125 mg per tablet Commonly known as:  AUGMENTIN Your last dose was: Your next dose is: Take 1 Tab by mouth every twelve (12) hours for 30 days. 1 Tab Blood-Glucose Meter monitoring kit Your last dose was: Your next dose is:    
   
   
 Check blood glucose at least once a day,  
     
   
   
   
  
 glucose blood VI test strips strip Commonly known as:  ASCENSIA AUTODISC VI, ONE TOUCH ULTRA TEST VI Your last dose was: Your next dose is:    
   
   
 1 strip daily  
     
   
   
   
  
 lisinopril 10 mg tablet Commonly known as:  Ora Bee Start taking on:  10/29/2017 Your last dose was: Your next dose is: Take 1 Tab by mouth daily for 30 days. 10 mg  
    
   
   
   
  
 metFORMIN 1,000 mg tablet Commonly known as:  GLUCOPHAGE Your last dose was: Your next dose is: Take 1 Tab by mouth two (2) times daily (with meals) for 30 days. 1000 mg Where to Get Your Medications Information on where to get these meds will be given to you by the nurse or doctor. ! Ask your nurse or doctor about these medications  
  amoxicillin-clavulanate 500-125 mg per tablet Blood-Glucose Meter monitoring kit  
 glucose blood VI test strips strip  
 lisinopril 10 mg tablet  
 metFORMIN 1,000 mg tablet

## 2017-10-26 NOTE — ED TRIAGE NOTES
Patient reports being at work yesterday and legs began hurting. Patient reports having fever at home last night and taking tylenol without relief. Patient reports sore throat, wheezing, and feeling \"achy\" all over. Patient arrives alert, oriented x4, and ambulatory.

## 2017-10-26 NOTE — IP AVS SNAPSHOT
[address] 
[phone] Patient: Daija Argueta MRN: [id number] [facility]:4/83/[id number] About your hospitalization You were admitted on:  October 26, 2017 You last received care in the:  [facility] 2 SURGICAL You were discharged on:  October 28, 2017 Why you were hospitalized Your primary diagnosis was:  Sirs (Systemic Inflammatory Response Syndrome) ([facility]) Your diagnoses also included:  Dm Type 2 (Diabetes Mellitus, Type 2) ([facility]), Essential Hypertension, Strep Throat Things You Need To Do (next 8 weeks) Follow up with PROVIDER UNKNOWN in 5 day(s)  
follow up with PCP in 3-5 days Where:  Patient not available to ask Discharge Orders None A check haley indicates which time of day the medication should be taken. My Medications TAKE these medications as instructed Instructions Each Dose to Equal  
 Morning Noon Evening Bedtime  
 amoxicillin-clavulanate 500-125 mg per tablet Commonly known as:  AUGMENTIN Your last dose was: Your next dose is: Take 1 Tab by mouth every twelve (12) hours for 30 days. 1 Tab Blood-Glucose Meter monitoring kit Your last dose was: Your next dose is:    
   
   
 Check blood glucose at least once a day,  
     
   
   
   
  
 glucose blood VI test strips strip Commonly known as:  ASCENSIA AUTODISC VI, ONE TOUCH ULTRA TEST VI Your last dose was: Your next dose is:    
   
   
 1 strip daily  
     
   
   
   
  
 lisinopril 10 mg tablet Commonly known as:  Rosana Castaneda Start taking on:  10/29/2017 Your last dose was: Your next dose is: Take 1 Tab by mouth daily for 30 days. 10 mg  
    
   
   
   
  
 metFORMIN 1,000 mg tablet Commonly known as:  GLUCOPHAGE Your last dose was: Your next dose is: Take 1 Tab by mouth two (2) times daily (with meals) for 30 days. 1000 mg Where to Get Your Medications Information on where to get these meds will be given to you by the nurse or doctor. ! Ask your nurse or doctor about these medications  
  amoxicillin-clavulanate 500-125 mg per tablet Blood-Glucose Meter monitoring kit  
 glucose blood VI test strips strip  
 lisinopril 10 mg tablet  
 metFORMIN 1,000 mg tablet Discharge Instructions DISCHARGE SUMMARY from Nurse PATIENT INSTRUCTIONS: 
 
After general anesthesia or intravenous sedation, for 24 hours or while taking prescription Narcotics: · Limit your activities · Do not drive and operate hazardous machinery · Do not make important personal or business decisions · Do  not drink alcoholic beverages · If you have not urinated within 8 hours after discharge, please contact your surgeon on call. Report the following to your surgeon: 
· Excessive pain, swelling, redness or odor of or around the surgical area · Temperature over 100.5 · Nausea and vomiting lasting longer than 4 hours or if unable to take medications · Any signs of decreased circulation or nerve impairment to extremity: change in color, persistent  numbness, tingling, coldness or increase pain · Any questions What to do at Home: 
Recommended activity: Activity as tolerated  
 
if you experience any of the following symptoms fever of 101 or greater that doesn't get better with Tylenol, pain unrelieved by medication, uncontrollable nausea or vomiting please follow up with PCP. *  Please give a list of your current medications to your Primary Care Provider. *  Please update this list whenever your medications are discontinued, doses are 
    changed, or new medications (including over-the-counter products) are added. *  Please carry medication information at all times in case of emergency situations. treatment when they arrive  up to an hour sooner than if someone gets to the hospital by car. The discharge information has been reviewed with the patient. The patient verbalized understanding. Discharge medications reviewed with the patient and appropriate educational materials and side effects teaching were provided. Sepsis: Care Instructions Your Care Instructions Sepsis is an infection that has spread throughout your body. It is a life-threatening condition and often causes extremely low blood pressure. This can lead to problems with many different organs. The cause of sepsis is not always clear, but it can happen as part of a long-term or sudden illness. Sometimes even a mild illness can lead to sepsis. Follow-up care is a key part of your treatment and safety. Be sure to make and go to all appointments, and call your doctor if you are having problems. It's also a good idea to know your test results and keep a list of the medicines you take. How can you care for yourself at home? · If your doctor prescribed antibiotics, take them as directed. Do not stop taking them just because you feel better. You need to take the full course of antibiotics. · Drink plenty of fluids, enough so that your urine is light yellow or clear like water. Choose water or caffeine-free clear liquids until you feel better. If you have kidney, heart, or liver disease and have to limit fluids, talk with your doctor before you increase your fluid intake. You can try rehydration drinks, such as Gatorade or Powerade. · Do not drink alcohol. · Eat a healthy diet. Include fruits, vegetables, and whole grains in your diet every day. · Walking is an easy way to get exercise. Gradually increase the amount you walk every day. Make sure your doctor knows that you are starting an exercise program. 
· Do not smoke or use other tobacco products.  If you need help quitting, talk to your doctor about stop-smoking programs and medicines. These can increase your chances of quitting for good. When should you call for help? Call 911 anytime you think you may need emergency care. For example, call if: 
? · You passed out (lost consciousness). ?Call your doctor now or seek immediate medical care if: 
? · You have a fever or chills. ? · You have cool, pale, or clammy skin. ? · You are dizzy or lightheaded, or you feel like you may faint. ? · You have any new symptoms, such as a cough, pain in one part of your body, or urinary problems. ? Watch closely for changes in your health, and be sure to contact your doctor if: 
? · You do not get better as expected. Where can you learn more? Go to http://cassandra-anna.info/. Enter P142 in the search box to learn more about \"Sepsis: Care Instructions. \" Current as of: March 20, 2017 Content Version: 11.4 © 6187-6803 "[employer]. Care instructions adapted under license by [employer] (which disclaims liability or warranty for this information). If you have questions about a medical condition or this instruction, always ask your healthcare professional. [place] [address] any warranty or liability for your use of this information. Strep Throat: Care Instructions Your Care Instructions Strep throat is a bacterial infection that causes sudden, severe sore throat and fever. Strep throat, which is caused by bacteria called streptococcus, is treated with antibiotics. Sometimes a strep test is necessary to tell if the sore throat is caused by strep bacteria. Treatment can help ease symptoms and may prevent future problems. Follow-up care is a key part of your treatment and safety. Be sure to make and go to all appointments, and call your doctor if you are having problems. It's also a good idea to know your test results and keep a list of the medicines you take. How can you care for yourself at home? · Take your antibiotics as directed. Do not stop taking them just because you feel better. You need to take the full course of antibiotics. · Strep throat can spread to others until 24 hours after you begin taking antibiotics. During this time, you should avoid contact with other people at work or home, especially infants and children. Do not sneeze or cough on others, and wash your hands often. Keep your drinking glass and eating utensils separate from those of others, and wash these items well in hot, soapy water. · Gargle with warm salt water at least once each hour to help reduce swelling and make your throat feel better. Use 1 teaspoon of salt mixed in 8 fluid ounces of warm water. · Take an over-the-counter pain medication, such as acetaminophen (Tylenol), ibuprofen (Advil, Motrin), or naproxen (Aleve). Read and follow all instructions on the label. · Try an over-the-counter anesthetic throat spray or throat lozenges, which may help relieve throat pain. · Drink plenty of fluids. Fluids may help soothe an irritated throat. Hot fluids, such as tea or soup, may help your throat feel better. · Eat soft solids and drink plenty of clear liquids. Flavored ice pops, ice cream, scrambled eggs, sherbet, and gelatin dessert (such as Jell-O) may also soothe the throat. · Get lots of rest. 
· Do not smoke, and avoid secondhand smoke. If you need help quitting, talk to your doctor about stop-smoking programs and medicines. These can increase your chances of quitting for good. · Use a vaporizer or humidifier to add moisture to the air in your bedroom. Follow the directions for cleaning the machine. When should you call for help? Call your doctor now or seek immediate medical care if: 
? · You have a new or higher fever. ? · You have a fever with a stiff neck or severe headache. ? · You have new or worse trouble swallowing. ? · Your sore throat gets much worse on one side. ? · Your pain becomes much worse on one side of your throat. ? Watch closely for changes in your health, and be sure to contact your doctor if: 
? · You are not getting better after 2 days (48 hours). ? · You do not get better as expected. Where can you learn more? Go to http://cassandra-anna.info/. Enter K625 in the search box to learn more about \"Strep Throat: Care Instructions. \" Current as of: May 12, 2017 Content Version: 11.4 © 1553-9472 [employer]. Care instructions adapted under license by [employer] (which disclaims liability or warranty for this information). If you have questions about a medical condition or this instruction, always ask your healthcare professional. [address] any warranty or liability for your use of this information. ___________________________________________________________________________________________________________________________________ [employer] Announcement We are excited to announce that we are making your provider's discharge notes available to you in [employer]. You will see these notes when they are completed and signed by the physician that discharged you from your recent hospital stay. If you have any questions or concerns about any information you see in [employer], please call the Health Information Department where you were seen or reach out to your Primary Care Provider for more information about your plan of care. Introducing \[facility]\"" & HEALTH SERVICES! [facility] introduces [employer] patient portal. Now you can access parts of your medical record, email your doctor's office, and request medication refills online. 1. In your internet browser, go to [email or web address] 2. Click on the First Time User? Click Here link in the Sign In box. You will see the New Member Sign Up page. 3. Enter your [employer] Access Code exactly as it appears below. You will not need to use this code after youve completed the sign-up process. If you do not sign up before the expiration date, you must request a new code. · [employer] Access Code: [email or web address] Expires: 12/8/2017  5:02 PM 
 
4. Enter the last four digits of your Social Security Number (xxxx) and Date of Birth (mm/dd/yyyy) as indicated and click Submit. You will be taken to the next sign-up page. 5. Create a [employer] ID. This will be your [employer] login ID and cannot be changed, so think of one that is secure and easy to remember. 6. Create a [employer] password. You can change your password at any time. 7. Enter your Password Reset Question and Answer. This can be used at a later time if you forget your password. 8. Enter your e-mail address. You will receive e-mail notification when new information is available in [address] 9. Click Sign Up. You can now view and download portions of your medical record. 10. Click the Download Summary menu link to download a portable copy of your medical information. If you have questions, please visit the Frequently Asked Questions section of the [employer] website. Remember, [employer] is NOT to be used for urgent needs. For medical emergencies, dial 911. Now available from your iPhone and Android! Unresulted Labs-Please follow up with your PCP about these lab tests Order Current Status CULTURE, BLOOD Preliminary result CULTURE, BLOOD Preliminary result CULTURE, STREP THROAT Preliminary result CULTURE, URINE Preliminary result Providers Seen During Your Hospitalization Provider Specialty Primary office phone Elizabeth Romero DO Emergency Medicine [phone] Saulo Ortega MD Emergency Medicine [phone] Ana Hubbard, [address] [place] [address] Internal Medicine [phone] Immunizations Administered for This Admission Name Date Influenza Vaccine (Quad) PF 10/28/2017 Your Primary Care Physician (PCP) Primary Care Physician Office Phone Office Fax UNKNOWN, PROVIDER ** None ** ** None ** You are allergic to the following Allergen Reactions Aspirin Other (comments) Unknown reaction Recent Documentation Height Weight BMI OB Status Smoking Status 1.473 m 63.5 kg 29.26 kg/m2 Hysterectomy Never Smoker Emergency Contacts Name Discharge Info Relation Home Work Mobile [employer] S[employer]  Other Relative [6] [phone] Patient Belongings The following personal items are in your possession at time of discharge: 
     Visual Aid: None             Clothing: Pants, Undergarments, Footwear, Shirt, Pajamas    Other Valuables: Cell Phone, Keys  Personal Items Sent to Safe: none Please provide this summary of care documentation to your next provider. Signatures-by signing, you are acknowledging that this After Visit Summary has been reviewed with you and you have received a copy. Patient Signature:  ____________________________________________________________ Date:  ____________________________________________________________  
  
Brittany Richter Provider Signature:  ____________________________________________________________ Date:  ____________________________________________________________

## 2017-10-26 NOTE — H&P
HOSPITALIST H&P      NAME:  Golden Brunner   Age:  38 y.o.  :   1975   MRN:   [id number]    PCP: PROVIDER UNKNOWN    Attending MD: Abhijit Plaza DO    Treatment Team: Attending Provider: Ariadne Priest MD; Primary Nurse: Opal Chan. Yvette Marks, ROMY    HPI:     Golden Brunner is a 38year old CF with no PMH that presented to the ER with 24 hours of sudden onset bilateral leg pain, fevers of 102F-103F, heart palpitations, and sore throat. She states that she was working as a  when her legs hurt suddenly and she felt feverish. This continued through the night into today despite taking Tylenol so she came to the ER. In the ER she had a fever >102F and her HR 120s. Her HR remained >120 despite adequate fluid bolus. Complete ROS done and is as stated in HPI or otherwise negative    Past Medical History:   Diagnosis Date    Hypertension         Past Surgical History:   Procedure Laterality Date    HX GYN      c-sec x2; hysterectomy        None       Allergies   Allergen Reactions    Aspirin Other (comments)     Unknown reaction        Social History   Substance Use Topics    Smoking status: Never Smoker    Smokeless tobacco: Never Used    Alcohol use No        History reviewed. No pertinent family history. Objective:       Visit Vitals    /74    Pulse (!) 113    Temp 98.2 °F (36.8 °C)    Resp 17    Ht 4' 10\" (1.473 m)    Wt 63.5 kg (140 lb)    SpO2 97%    BMI 29.26 kg/m2        Temp (24hrs), Av.7 °F (37.6 °C), Min:98.2 °F (36.8 °C), Max:102.6 °F (39.2 °C)      Oxygen Therapy  O2 Sat (%): 97 % (10/26/17 1819)  Pulse via Oximetry: 114 beats per minute (10/26/17 1819)  O2 Device: Room air (10/26/17 1819)      Physical Exam:    General:    Alert, cooperative, no distress, appears stated age. Eyes:   PERRLA EOMI Anicteric  Head:   Normocephalic, without obvious abnormality, atraumatic. ENT:  Nares normal. No drainage.   Lungs:   Clear to auscultation bilaterally. No Wheezing or Rhonchi. No rales. Heart:   Tachycardic, reg rhythm, no murmur, rub or gallop. No JVD. Abdomen:   Soft, non-tender. Not distended. Bowel sounds normal.   MSK:  No edema. No clubbing or cyanosis. No deformities/lesions. Skin:     Texture, turgor normal. No rashes or lesions. No Jaundice. Neurologic: Alert and oriented x 3, no focal deficits   Psychiatry:      No depression/anxiety. Mood congruent for context. Heme/Lymph/Immune: No echymoses or overt signs of bleeding. Data Review:   Recent Results (from the past 24 hour(s))   STREP AG SCREEN, GROUP A    Collection Time: 10/26/17  1:20 PM   Result Value Ref Range    Group A Strep Ag ID NEGATIVE  NEG     INFLUENZA A & B AG (RAPID TEST)    Collection Time: 10/26/17  1:20 PM   Result Value Ref Range    Influenza A Ag NEGATIVE  NEG      Influenza B Ag NEGATIVE  NEG     CBC WITH AUTOMATED DIFF    Collection Time: 10/26/17  1:20 PM   Result Value Ref Range    WBC 15.3 (H) 4.3 - 11.1 K/uL    RBC 4.80 4.05 - 5.25 M/uL    HGB 14.4 11.7 - 15.4 g/dL    HCT 41.3 35.8 - 46.3 %    MCV 86.0 79.6 - 97.8 FL    MCH 30.0 26.1 - 32.9 PG    MCHC 34.9 31.4 - 35.0 g/dL    RDW 12.6 11.9 - 14.6 %    PLATELET 954 495 - 900 K/uL    MPV 10.1 (L) 10.8 - 14.1 FL    DF AUTOMATED      NEUTROPHILS 84 (H) 43 - 78 %    LYMPHOCYTES 10 (L) 13 - 44 %    MONOCYTES 5 4.0 - 12.0 %    EOSINOPHILS 0 (L) 0.5 - 7.8 %    BASOPHILS 0 0.0 - 2.0 %    IMMATURE GRANULOCYTES 1 0.0 - 5.0 %    ABS. NEUTROPHILS 12.9 (H) 1.7 - 8.2 K/UL    ABS. LYMPHOCYTES 1.5 0.5 - 4.6 K/UL    ABS. MONOCYTES 0.8 0.1 - 1.3 K/UL    ABS. EOSINOPHILS 0.0 0.0 - 0.8 K/UL    ABS. BASOPHILS 0.0 0.0 - 0.2 K/UL    ABS. IMM.  GRANS. 0.1 0.0 - 0.5 K/UL   METABOLIC PANEL, COMPREHENSIVE    Collection Time: 10/26/17  1:20 PM   Result Value Ref Range    Sodium 134 (L) 136 - 145 mmol/L    Potassium 3.3 (L) 3.5 - 5.1 mmol/L    Chloride 99 98 - 107 mmol/L    CO2 23 21 - 32 mmol/L    Anion gap 12 7 - 16 mmol/L    Glucose 214 (H) 65 - 100 mg/dL    BUN 8 6 - 23 MG/DL    Creatinine 0.90 0.6 - 1.0 MG/DL    GFR est AA >60 >60 ml/min/1.73m2    GFR est non-AA >60 >60 ml/min/1.73m2    Calcium 8.6 8.3 - 10.4 MG/DL    Bilirubin, total 0.9 0.2 - 1.1 MG/DL    ALT (SGPT) 39 12 - 65 U/L    AST (SGOT) 41 (H) 15 - 37 U/L    Alk. phosphatase 96 50 - 136 U/L    Protein, total 8.2 6.3 - 8.2 g/dL    Albumin 3.3 (L) 3.5 - 5.0 g/dL    Globulin 4.9 (H) 2.3 - 3.5 g/dL    A-G Ratio 0.7 (L) 1.2 - 3.5     POC LACTIC ACID    Collection Time: 10/26/17  1:20 PM   Result Value Ref Range    Lactic Acid (POC) 2.4 (H) 0.5 - 1.9 mmol/L   PROCALCITONIN    Collection Time: 10/26/17  3:15 PM   Result Value Ref Range    Procalcitonin 0.3 ng/mL   URINE MICROSCOPIC    Collection Time: 10/26/17  4:10 PM   Result Value Ref Range    WBC 3-5 0 /hpf    RBC 0-3 0 /hpf    Epithelial cells 10-20 0 /hpf    Bacteria 4+ (H) 0 /hpf    Casts 0 0 /lpf    Crystals, urine 0 0 /LPF    Mucus 1+ (H) 0 /lpf    Other observations RESULTS VERIFIED MANUALLY         Imaging /Procedures /Studies    Chest X-Ray  Lung fields, cardiac silhouette, soft tissues, and bony structures are  unremarkable. Assessment and Plan:     1. Sepsis of Unknown Etiology - WBC 15.3, T 103F, HR 120s. UA with 3+ bacteria. Sore throat, no cough. Sepsis protocol started in ER. Fluid bolus --> continue at 150 ml/hr. Procal 0.3. Blood cultures checked. Urine culture checked. Continue IV Levaquin. 2. Leg Pain - Improved after \"breaking fever\". Check CK. Monitor. 3. Sore Throat - Strep negative. No erythema/exudates. Monitor.       Code Status: FULL CODE  DVT Prophylaxis: Lovenox    Anticipated discharge: 48 hours      Ibis Burkett DO  6:24 PM

## 2017-10-26 NOTE — ED PROVIDER NOTES
HPI Comments: Present complaint of fever chills sore throat and wheezing since yesterday. She denies dysuria. She did not get a flu shot this year. She states she's been wheezing but has no history of asthma. She is not a smoker. Patient is a 38 y.o. female presenting with fever. The history is provided by the patient. Fever    This is a new problem. The current episode started yesterday. The problem occurs constantly. The problem has been gradually worsening. The maximum temperature noted was 102 - 102.9 F. Associated symptoms include congestion, sore throat, muscle aches, cough and shortness of breath. Pertinent negatives include no urinary symptoms. She has tried nothing for the symptoms. Past Medical History:   Diagnosis Date    Hypertension        Past Surgical History:   Procedure Laterality Date    HX GYN      c-sec x2; hysterectomy         History reviewed. No pertinent family history. Social History     Social History    Marital status:      Spouse name: N/A    Number of children: N/A    Years of education: N/A     Occupational History    Not on file. Social History Main Topics    Smoking status: Never Smoker    Smokeless tobacco: Never Used    Alcohol use No    Drug use: No    Sexual activity: Yes     Birth control/ protection: Surgical     Other Topics Concern    Not on file     Social History Narrative         ALLERGIES: Aspirin    Review of Systems   Constitutional: Positive for chills and fever. HENT: Positive for congestion and sore throat. Respiratory: Positive for cough and shortness of breath. All other systems reviewed and are negative.       Vitals:    10/26/17 1305 10/26/17 1514 10/26/17 1530   BP: (!) 163/96 (!) 156/99    Pulse: (!) 124 (!) 111    Resp: 20     Temp: (!) 102.6 °F (39.2 °C) 98.3 °F (36.8 °C)    SpO2: 96% 96% 96%   Weight: 63.5 kg (140 lb)     Height: 4' 10\" (1.473 m)              Physical Exam   Constitutional: She is oriented to person, place, and time. She appears well-developed and well-nourished. No distress. HENT:   Head: Normocephalic and atraumatic. Neck: Normal range of motion. Neck supple. Cardiovascular: Normal rate and regular rhythm. Pulmonary/Chest: Effort normal. No respiratory distress. She has wheezes (at bases). She has no rales. Abdominal: Soft. She exhibits no distension. There is no tenderness. There is no rebound and no guarding. Musculoskeletal: Normal range of motion. She exhibits no edema or tenderness. Neurological: She is alert and oriented to person, place, and time. No cranial nerve deficit. Skin: Skin is warm and dry. No rash noted. She is not diaphoretic. No erythema. Psychiatric: She has a normal mood and affect. Her behavior is normal.   Nursing note and vitals reviewed. MDM  Number of Diagnoses or Management Options  Acute bronchitis, unspecified organism:   Sepsis, due to unspecified organism [facility]):   Diagnosis management comments: Pt feels better and sounds are clear and she has received 2-1/2 L of fluid. She is persistently tachycardic with an elevated lactic with a normal pro-calcitonin. She looks very well. Given her persistent tachycardia she will need admission.        Amount and/or Complexity of Data Reviewed  Clinical lab tests: ordered and reviewed  Discuss the patient with other providers: yes  Independent visualization of images, tracings, or specimens: yes (Sinus tachy)    Risk of Complications, Morbidity, and/or Mortality  Presenting problems: high  Diagnostic procedures: moderate  Management options: high    Patient Progress  Patient progress: improved    ED Course       Procedures

## 2017-10-27 PROBLEM — R65.10 SIRS (SYSTEMIC INFLAMMATORY RESPONSE SYNDROME) (HCC): Status: ACTIVE | Noted: 2017-10-26

## 2017-10-27 PROBLEM — I10 ESSENTIAL HYPERTENSION: Status: ACTIVE | Noted: 2017-10-27

## 2017-10-27 PROBLEM — E11.9 DM TYPE 2 (DIABETES MELLITUS, TYPE 2) (HCC): Status: ACTIVE | Noted: 2017-10-27

## 2017-10-27 LAB
ALBUMIN SERPL-MCNC: 2.9 G/DL (ref 3.5–5)
ALBUMIN/GLOB SERPL: 0.6 {RATIO} (ref 1.2–3.5)
ALP SERPL-CCNC: 89 U/L (ref 50–136)
ALT SERPL-CCNC: 37 U/L (ref 12–65)
ANION GAP SERPL CALC-SCNC: 11 MMOL/L (ref 7–16)
ANION GAP SERPL CALC-SCNC: 13 MMOL/L (ref 7–16)
AST SERPL-CCNC: 37 U/L (ref 15–37)
ATRIAL RATE: 116 BPM
BASOPHILS # BLD: 0 K/UL (ref 0–0.2)
BASOPHILS NFR BLD: 0 % (ref 0–2)
BILIRUB SERPL-MCNC: 0.4 MG/DL (ref 0.2–1.1)
BUN SERPL-MCNC: 10 MG/DL (ref 6–23)
BUN SERPL-MCNC: 11 MG/DL (ref 6–23)
CALCIUM SERPL-MCNC: 8.2 MG/DL (ref 8.3–10.4)
CALCIUM SERPL-MCNC: 8.3 MG/DL (ref 8.3–10.4)
CALCULATED P AXIS, ECG09: 51 DEGREES
CALCULATED R AXIS, ECG10: 47 DEGREES
CALCULATED T AXIS, ECG11: 38 DEGREES
CHLORIDE SERPL-SCNC: 108 MMOL/L (ref 98–107)
CHLORIDE SERPL-SCNC: 109 MMOL/L (ref 98–107)
CO2 SERPL-SCNC: 18 MMOL/L (ref 21–32)
CO2 SERPL-SCNC: 19 MMOL/L (ref 21–32)
CREAT SERPL-MCNC: 0.66 MG/DL (ref 0.6–1)
CREAT SERPL-MCNC: 0.66 MG/DL (ref 0.6–1)
DIAGNOSIS, 93000: NORMAL
DIFFERENTIAL METHOD BLD: ABNORMAL
EOSINOPHIL # BLD: 0 K/UL (ref 0–0.8)
EOSINOPHIL NFR BLD: 0 % (ref 0.5–7.8)
ERYTHROCYTE [DISTWIDTH] IN BLOOD BY AUTOMATED COUNT: 12.9 % (ref 11.9–14.6)
EST. AVERAGE GLUCOSE BLD GHB EST-MCNC: 183 MG/DL
GLOBULIN SER CALC-MCNC: 4.7 G/DL (ref 2.3–3.5)
GLUCOSE BLD STRIP.AUTO-MCNC: 185 MG/DL (ref 65–100)
GLUCOSE BLD STRIP.AUTO-MCNC: 189 MG/DL (ref 65–100)
GLUCOSE SERPL-MCNC: 194 MG/DL (ref 65–100)
GLUCOSE SERPL-MCNC: 202 MG/DL (ref 65–100)
HBA1C MFR BLD: 8 % (ref 4.8–6)
HCT VFR BLD AUTO: 38.8 % (ref 35.8–46.3)
HGB BLD-MCNC: 12.9 G/DL (ref 11.7–15.4)
IMM GRANULOCYTES # BLD: 0.1 K/UL (ref 0–0.5)
IMM GRANULOCYTES NFR BLD: 0 % (ref 0–5)
LACTATE SERPL-SCNC: 0.3 MMOL/L (ref 0.4–2)
LYMPHOCYTES # BLD: 1.5 K/UL (ref 0.5–4.6)
LYMPHOCYTES NFR BLD: 7 % (ref 13–44)
MCH RBC QN AUTO: 29.3 PG (ref 26.1–32.9)
MCHC RBC AUTO-ENTMCNC: 33.2 G/DL (ref 31.4–35)
MCV RBC AUTO: 88.2 FL (ref 79.6–97.8)
MONOCYTES # BLD: 0.6 K/UL (ref 0.1–1.3)
MONOCYTES NFR BLD: 3 % (ref 4–12)
NEUTS SEG # BLD: 17.7 K/UL (ref 1.7–8.2)
NEUTS SEG NFR BLD: 90 % (ref 43–78)
P-R INTERVAL, ECG05: 128 MS
PLATELET # BLD AUTO: 252 K/UL (ref 150–450)
PMV BLD AUTO: 10.4 FL (ref 10.8–14.1)
POTASSIUM SERPL-SCNC: 3.5 MMOL/L (ref 3.5–5.1)
POTASSIUM SERPL-SCNC: 3.6 MMOL/L (ref 3.5–5.1)
PROT SERPL-MCNC: 7.6 G/DL (ref 6.3–8.2)
Q-T INTERVAL, ECG07: 318 MS
QRS DURATION, ECG06: 76 MS
QTC CALCULATION (BEZET), ECG08: 442 MS
RBC # BLD AUTO: 4.4 M/UL (ref 4.05–5.25)
SODIUM SERPL-SCNC: 138 MMOL/L (ref 136–145)
SODIUM SERPL-SCNC: 140 MMOL/L (ref 136–145)
VENTRICULAR RATE, ECG03: 116 BPM
WBC # BLD AUTO: 19.9 K/UL (ref 4.3–11.1)

## 2017-10-27 PROCEDURE — 65660000000 HC RM CCU STEPDOWN

## 2017-10-27 PROCEDURE — 80048 BASIC METABOLIC PNL TOTAL CA: CPT | Performed by: HOSPITALIST

## 2017-10-27 PROCEDURE — 74011636637 HC RX REV CODE- 636/637: Performed by: HOSPITALIST

## 2017-10-27 PROCEDURE — 36415 COLL VENOUS BLD VENIPUNCTURE: CPT | Performed by: INTERNAL MEDICINE

## 2017-10-27 PROCEDURE — 83036 HEMOGLOBIN GLYCOSYLATED A1C: CPT | Performed by: INTERNAL MEDICINE

## 2017-10-27 PROCEDURE — 77030027138 HC INCENT SPIROMETER -A

## 2017-10-27 PROCEDURE — 83605 ASSAY OF LACTIC ACID: CPT | Performed by: HOSPITALIST

## 2017-10-27 PROCEDURE — 85025 COMPLETE CBC W/AUTO DIFF WBC: CPT | Performed by: INTERNAL MEDICINE

## 2017-10-27 PROCEDURE — 74011250637 HC RX REV CODE- 250/637: Performed by: HOSPITALIST

## 2017-10-27 PROCEDURE — 80053 COMPREHEN METABOLIC PANEL: CPT | Performed by: INTERNAL MEDICINE

## 2017-10-27 PROCEDURE — 74011250636 HC RX REV CODE- 250/636: Performed by: INTERNAL MEDICINE

## 2017-10-27 PROCEDURE — 74011250637 HC RX REV CODE- 250/637: Performed by: INTERNAL MEDICINE

## 2017-10-27 PROCEDURE — 74011250636 HC RX REV CODE- 250/636: Performed by: HOSPITALIST

## 2017-10-27 PROCEDURE — 82962 GLUCOSE BLOOD TEST: CPT

## 2017-10-27 RX ORDER — LISINOPRIL 5 MG/1
10 TABLET ORAL DAILY
Status: DISCONTINUED | OUTPATIENT
Start: 2017-10-28 | End: 2017-10-28 | Stop reason: HOSPADM

## 2017-10-27 RX ORDER — ONDANSETRON 2 MG/ML
4 INJECTION INTRAMUSCULAR; INTRAVENOUS
Status: DISCONTINUED | OUTPATIENT
Start: 2017-10-27 | End: 2017-10-28 | Stop reason: HOSPADM

## 2017-10-27 RX ORDER — INSULIN LISPRO 100 [IU]/ML
INJECTION, SOLUTION INTRAVENOUS; SUBCUTANEOUS
Status: DISCONTINUED | OUTPATIENT
Start: 2017-10-27 | End: 2017-10-28 | Stop reason: HOSPADM

## 2017-10-27 RX ORDER — AMOXICILLIN AND CLAVULANATE POTASSIUM 500; 125 MG/1; MG/1
1 TABLET, FILM COATED ORAL EVERY 12 HOURS
Status: DISCONTINUED | OUTPATIENT
Start: 2017-10-27 | End: 2017-10-28 | Stop reason: HOSPADM

## 2017-10-27 RX ORDER — LEVOFLOXACIN 5 MG/ML
500 INJECTION, SOLUTION INTRAVENOUS EVERY 24 HOURS
Status: DISCONTINUED | OUTPATIENT
Start: 2017-10-27 | End: 2017-10-27

## 2017-10-27 RX ORDER — LISINOPRIL 20 MG/1
10 TABLET ORAL DAILY
Qty: 15 TAB | Refills: 0 | Status: SHIPPED | OUTPATIENT
Start: 2017-10-27 | End: 2017-10-28

## 2017-10-27 RX ORDER — LISINOPRIL 5 MG/1
10 TABLET ORAL
Status: COMPLETED | OUTPATIENT
Start: 2017-10-27 | End: 2017-10-27

## 2017-10-27 RX ORDER — INSULIN PUMP SYRINGE, 3 ML
EACH MISCELLANEOUS
Qty: 1 KIT | Refills: 0 | Status: SHIPPED | OUTPATIENT
Start: 2017-10-27

## 2017-10-27 RX ORDER — METFORMIN HYDROCHLORIDE 1000 MG/1
1000 TABLET ORAL 2 TIMES DAILY WITH MEALS
Qty: 60 TAB | Refills: 0 | Status: SHIPPED | OUTPATIENT
Start: 2017-10-27 | End: 2017-11-26

## 2017-10-27 RX ADMIN — AMOXICILLIN AND CLAVULANATE POTASSIUM 1 TABLET: 500; 125 TABLET, FILM COATED ORAL at 20:52

## 2017-10-27 RX ADMIN — LISINOPRIL 10 MG: 5 TABLET ORAL at 13:44

## 2017-10-27 RX ADMIN — ONDANSETRON 4 MG: 2 INJECTION INTRAMUSCULAR; INTRAVENOUS at 15:28

## 2017-10-27 RX ADMIN — SODIUM CHLORIDE 150 ML/HR: 900 INJECTION, SOLUTION INTRAVENOUS at 01:19

## 2017-10-27 RX ADMIN — INSULIN LISPRO 2 UNITS: 100 INJECTION, SOLUTION INTRAVENOUS; SUBCUTANEOUS at 17:27

## 2017-10-27 RX ADMIN — ACETAMINOPHEN 650 MG: 325 TABLET ORAL at 20:59

## 2017-10-27 RX ADMIN — ENOXAPARIN SODIUM 40 MG: 40 INJECTION SUBCUTANEOUS at 20:52

## 2017-10-27 RX ADMIN — LEVOFLOXACIN 500 MG: 5 INJECTION, SOLUTION INTRAVENOUS at 13:44

## 2017-10-27 RX ADMIN — Medication 10 ML: at 05:43

## 2017-10-27 RX ADMIN — INSULIN LISPRO 2 UNITS: 100 INJECTION, SOLUTION INTRAVENOUS; SUBCUTANEOUS at 22:18

## 2017-10-27 NOTE — PROGRESS NOTES
END OF SHIFT NOTE:    INTAKE/OUTPUT  10/26 0701 - 10/27 0700  In: 7357 [I.V.:1079]  Out: 1050 [Urine:1050]  Voiding: YES  Catheter: NO  Drain:        Flatus: Patient does have flatus present. Stool:  0 occurrences. Characteristics:       Emesis: 0 occurrences. Characteristics:        VITAL SIGNS  Patient Vitals for the past 12 hrs:   Temp Pulse Resp BP SpO2   10/27/17 1445 99.1 °F (37.3 °C) (!) 110 17 (!) 146/96 98 %   10/27/17 1135 99.5 °F (37.5 °C) (!) 109 16 (!) 142/95 98 %   10/27/17 0700 97.9 °F (36.6 °C) (!) 102 16 (!) 157/95 96 %       Pain Assessment  Pain Intensity 1: 0 (10/27/17 0119)  Pain Location 1: Leg     Patient Stated Pain Goal: 0    Ambulating  Yes    Shift report given to oncoming nurse at the bedside.     Hunter Aguilar RN

## 2017-10-27 NOTE — PROGRESS NOTES
Critical lactic 2.5 called at 2208, phos 1.8, magnesium 2, and monitor tech called to report that HR dropped to 35 and back up to 55. IVF infusing at 150 ml/hr. Placed call to Dr. Ely [facility] and reported events.  Continue fluids at same rate repeat U/A and continue to monitor HR

## 2017-10-27 NOTE — PROGRESS NOTES
Hospitalist Progress Note        Subjective:   Daily Progress Note: 10/27/2017 2:51 PM    Hx: 42F with prior hx of Dm on Metformin and HTN Lisinopril which were Dced about 3 years ago when patient lost weight and her blood glucose and BP improved. Patient presented to the ER with fever, b/l leg weakness that resolved when fever abated. In ER patient was tachycardic which persisted despite IVF. 10/27: Patient reported that she was feeling better overall. Felt a little SOB this morning but did not persist. NO GI or  complaints. Review of Systems  -10 systems reviewed and are negative other than stated in subjective data above    Objective:     Visit Vitals    BP (!) 142/95    Pulse (!) 109    Temp 99.5 °F (37.5 °C)    Resp 16    Ht 4' 10\" (1.473 m)    Wt 63.5 kg (140 lb)    SpO2 98%    BMI 29.26 kg/m2      O2 Device: Room air    Temp (24hrs), Av.4 °F (36.9 °C), Min:97.9 °F (36.6 °C), Max:99.5 °F (37.5 °C)         10/25 1901 - 10/27 0700  In: 1079 [I.V.:1079]  Out: 1050 [Urine:1050]    General appearance: alert, cooperative, no distress, appears stated age, not toxic  Neck: supple  Lungs: clear to auscultation bilaterally  Heart: regular rate and rhythm, S1, S2 normal  Abdomen: soft, non-tender.  Bowel sounds normal. No masses,  no organomegaly  Extremities: extremities normal, atraumatic, no cyanosis or edema  Skin: Skin color, texture, turgor normal. No rashes or lesions  Neurologic: Grossly normal  Psych: AAOx3, normal mood and affect, insight intact        Data Review    Recent Results (from the past 24 hour(s))   PROCALCITONIN    Collection Time: 10/26/17  3:15 PM   Result Value Ref Range    Procalcitonin 0.3 ng/mL   URINE MICROSCOPIC    Collection Time: 10/26/17  4:10 PM   Result Value Ref Range    WBC 3-5 0 /hpf    RBC 0-3 0 /hpf    Epithelial cells 10-20 0 /hpf    Bacteria 4+ (H) 0 /hpf    Casts 0 0 /lpf    Crystals, urine 0 0 /LPF    Mucus 1+ (H) 0 /lpf    Other observations RESULTS VERIFIED MANUALLY     CULTURE, URINE    Collection Time: 10/26/17  4:10 PM   Result Value Ref Range    Special Requests: NO SPECIAL REQUESTS      Culture result:        NO GROWTH AFTER SHORT PERIOD OF INCUBATION. FURTHER RESULTS TO FOLLOW AFTER OVERNIGHT INCUBATION. CULTURE, BLOOD    Collection Time: 10/26/17  5:49 PM   Result Value Ref Range    Special Requests: RIGHT  Antecubital        Culture result: NO GROWTH AFTER 18 HOURS     EKG, 12 LEAD, INITIAL    Collection Time: 10/26/17  5:56 PM   Result Value Ref Range    Ventricular Rate 116 BPM    Atrial Rate 116 BPM    P-R Interval 128 ms    QRS Duration 76 ms    Q-T Interval 318 ms    QTC Calculation (Bezet) 442 ms    Calculated P Axis 51 degrees    Calculated R Axis 47 degrees    Calculated T Axis 38 degrees    Diagnosis       !! AGE AND GENDER SPECIFIC ECG ANALYSIS !!   Sinus tachycardia  Otherwise normal ECG  When compared with ECG of 09-SEP-2017 15:51,  No significant change was found     CULTURE, BLOOD    Collection Time: 10/26/17  6:02 PM   Result Value Ref Range    Special Requests: RIGHT  HAND        Culture result: NO GROWTH AFTER 18 HOURS     LACTIC ACID    Collection Time: 10/26/17  9:22 PM   Result Value Ref Range    Lactic acid 2.5 (HH) 0.4 - 2.0 MMOL/L   MAGNESIUM    Collection Time: 10/26/17  9:22 PM   Result Value Ref Range    Magnesium 2.0 1.8 - 2.4 mg/dL   PHOSPHORUS    Collection Time: 10/26/17  9:22 PM   Result Value Ref Range    Phosphorus 1.8 (L) 2.5 - 4.5 MG/DL   URINALYSIS W/ RFLX MICROSCOPIC    Collection Time: 10/26/17 10:54 PM   Result Value Ref Range    Color YELLOW      Appearance CLEAR      Specific gravity 1.036 (H) 1.001 - 1.023      pH (UA) 5.5 5.0 - 9.0      Protein TRACE (A) NEG mg/dL    Glucose >1000 mg/dL    Ketone >80 (A) NEG mg/dL    Bilirubin NEGATIVE  NEG      Blood TRACE (A) NEG      Urobilinogen 0.2 0.2 - 1.0 EU/dL    Nitrites NEGATIVE  NEG      Leukocyte Esterase NEGATIVE  NEG      WBC 0-3 0 /hpf    RBC 0-3 0 /hpf Epithelial cells 3-5 0 /hpf    Bacteria TRACE 0 /hpf    Casts 0-3 0 /lpf   METABOLIC PANEL, COMPREHENSIVE    Collection Time: 10/27/17  6:24 AM   Result Value Ref Range    Sodium 140 136 - 145 mmol/L    Potassium 3.6 3.5 - 5.1 mmol/L    Chloride 109 (H) 98 - 107 mmol/L    CO2 18 (L) 21 - 32 mmol/L    Anion gap 13 7 - 16 mmol/L    Glucose 194 (H) 65 - 100 mg/dL    BUN 10 6 - 23 MG/DL    Creatinine 0.66 0.6 - 1.0 MG/DL    GFR est AA >60 >60 ml/min/1.73m2    GFR est non-AA >60 >60 ml/min/1.73m2    Calcium 8.3 8.3 - 10.4 MG/DL    Bilirubin, total 0.4 0.2 - 1.1 MG/DL    ALT (SGPT) 37 12 - 65 U/L    AST (SGOT) 37 15 - 37 U/L    Alk. phosphatase 89 50 - 136 U/L    Protein, total 7.6 6.3 - 8.2 g/dL    Albumin 2.9 (L) 3.5 - 5.0 g/dL    Globulin 4.7 (H) 2.3 - 3.5 g/dL    A-G Ratio 0.6 (L) 1.2 - 3.5     CBC WITH AUTOMATED DIFF    Collection Time: 10/27/17  6:24 AM   Result Value Ref Range    WBC 19.9 (H) 4.3 - 11.1 K/uL    RBC 4.40 4.05 - 5.25 M/uL    HGB 12.9 11.7 - 15.4 g/dL    HCT 38.8 35.8 - 46.3 %    MCV 88.2 79.6 - 97.8 FL    MCH 29.3 26.1 - 32.9 PG    MCHC 33.2 31.4 - 35.0 g/dL    RDW 12.9 11.9 - 14.6 %    PLATELET 128 934 - 666 K/uL    MPV 10.4 (L) 10.8 - 14.1 FL    DF AUTOMATED      NEUTROPHILS 90 (H) 43 - 78 %    LYMPHOCYTES 7 (L) 13 - 44 %    MONOCYTES 3 (L) 4.0 - 12.0 %    EOSINOPHILS 0 (L) 0.5 - 7.8 %    BASOPHILS 0 0.0 - 2.0 %    IMMATURE GRANULOCYTES 0 0.0 - 5.0 %    ABS. NEUTROPHILS 17.7 (H) 1.7 - 8.2 K/UL    ABS. LYMPHOCYTES 1.5 0.5 - 4.6 K/UL    ABS. MONOCYTES 0.6 0.1 - 1.3 K/UL    ABS. EOSINOPHILS 0.0 0.0 - 0.8 K/UL    ABS. BASOPHILS 0.0 0.0 - 0.2 K/UL    ABS. IMM.  GRANS. 0.1 0.0 - 0.5 K/UL   HEMOGLOBIN A1C WITH EAG    Collection Time: 10/27/17  6:24 AM   Result Value Ref Range    Hemoglobin A1c 8.0 (H) 4.8 - 6.0 %    Est. average glucose 183 mg/dL         Assessment/Plan:     Principal Problem:    SIRS (systemic inflammatory response syndrome) ([facility] (10/26/2017)    Active Problems:    DM type 2 (diabetes mellitus, type 2) ([facility] (10/27/2017)      Essential hypertension (10/27/2017)          Plan:   Decrease IVF to 100ml/he given complaints to SOB  Labs showed metabolic acidosis, lactate was elevate- repeat BMP and lactic acid  Start Lisinopril 10mg  ssi and AC HS FSBG  Pt used to be on metformin, will ensure  LA normalizes prior to initiation if not an alternative agent will be used. Monitor for another 24 hrs.     DVT Prophylaxis: on Lovenox    Signed By: Ry Tony MD     October 27, 2017

## 2017-10-28 VITALS
DIASTOLIC BLOOD PRESSURE: 82 MMHG | TEMPERATURE: 98.5 F | RESPIRATION RATE: 19 BRPM | HEART RATE: 97 BPM | HEIGHT: 58 IN | OXYGEN SATURATION: 96 % | SYSTOLIC BLOOD PRESSURE: 126 MMHG | WEIGHT: 140 LBS | BODY MASS INDEX: 29.39 KG/M2

## 2017-10-28 PROBLEM — J02.0 STREP THROAT: Status: ACTIVE | Noted: 2017-10-28

## 2017-10-28 PROBLEM — R65.10 SIRS (SYSTEMIC INFLAMMATORY RESPONSE SYNDROME) (HCC): Status: RESOLVED | Noted: 2017-10-26 | Resolved: 2017-10-28

## 2017-10-28 LAB
ALBUMIN SERPL-MCNC: 2.6 G/DL (ref 3.5–5)
ALBUMIN/GLOB SERPL: 0.6 {RATIO} (ref 1.2–3.5)
ALP SERPL-CCNC: 81 U/L (ref 50–136)
ALT SERPL-CCNC: 37 U/L (ref 12–65)
ANION GAP SERPL CALC-SCNC: 12 MMOL/L (ref 7–16)
AST SERPL-CCNC: 39 U/L (ref 15–37)
BASOPHILS # BLD: 0 K/UL (ref 0–0.2)
BASOPHILS NFR BLD: 0 % (ref 0–2)
BILIRUB SERPL-MCNC: 0.5 MG/DL (ref 0.2–1.1)
BUN SERPL-MCNC: 10 MG/DL (ref 6–23)
CALCIUM SERPL-MCNC: 8.1 MG/DL (ref 8.3–10.4)
CHLORIDE SERPL-SCNC: 104 MMOL/L (ref 98–107)
CO2 SERPL-SCNC: 20 MMOL/L (ref 21–32)
CREAT SERPL-MCNC: 0.6 MG/DL (ref 0.6–1)
DIFFERENTIAL METHOD BLD: ABNORMAL
EOSINOPHIL # BLD: 0 K/UL (ref 0–0.8)
EOSINOPHIL NFR BLD: 0 % (ref 0.5–7.8)
ERYTHROCYTE [DISTWIDTH] IN BLOOD BY AUTOMATED COUNT: 13 % (ref 11.9–14.6)
GLOBULIN SER CALC-MCNC: 4.3 G/DL (ref 2.3–3.5)
GLUCOSE BLD STRIP.AUTO-MCNC: 256 MG/DL (ref 65–100)
GLUCOSE SERPL-MCNC: 177 MG/DL (ref 65–100)
HCT VFR BLD AUTO: 37.3 % (ref 35.8–46.3)
HGB BLD-MCNC: 12.6 G/DL (ref 11.7–15.4)
IMM GRANULOCYTES # BLD: 0.1 K/UL (ref 0–0.5)
IMM GRANULOCYTES NFR BLD: 1 % (ref 0–5)
LYMPHOCYTES # BLD: 1.6 K/UL (ref 0.5–4.6)
LYMPHOCYTES NFR BLD: 14 % (ref 13–44)
MCH RBC QN AUTO: 29.2 PG (ref 26.1–32.9)
MCHC RBC AUTO-ENTMCNC: 33.8 G/DL (ref 31.4–35)
MCV RBC AUTO: 86.3 FL (ref 79.6–97.8)
MONOCYTES # BLD: 0.9 K/UL (ref 0.1–1.3)
MONOCYTES NFR BLD: 8 % (ref 4–12)
NEUTS SEG # BLD: 9.2 K/UL (ref 1.7–8.2)
NEUTS SEG NFR BLD: 77 % (ref 43–78)
PLATELET # BLD AUTO: 263 K/UL (ref 150–450)
PMV BLD AUTO: 9.9 FL (ref 10.8–14.1)
POTASSIUM SERPL-SCNC: 3.4 MMOL/L (ref 3.5–5.1)
PROT SERPL-MCNC: 6.9 G/DL (ref 6.3–8.2)
RBC # BLD AUTO: 4.32 M/UL (ref 4.05–5.25)
SODIUM SERPL-SCNC: 136 MMOL/L (ref 136–145)
WBC # BLD AUTO: 11.8 K/UL (ref 4.3–11.1)

## 2017-10-28 PROCEDURE — 85025 COMPLETE CBC W/AUTO DIFF WBC: CPT | Performed by: INTERNAL MEDICINE

## 2017-10-28 PROCEDURE — 90686 IIV4 VACC NO PRSV 0.5 ML IM: CPT | Performed by: INTERNAL MEDICINE

## 2017-10-28 PROCEDURE — 36415 COLL VENOUS BLD VENIPUNCTURE: CPT | Performed by: INTERNAL MEDICINE

## 2017-10-28 PROCEDURE — 90471 IMMUNIZATION ADMIN: CPT

## 2017-10-28 PROCEDURE — 80053 COMPREHEN METABOLIC PANEL: CPT | Performed by: INTERNAL MEDICINE

## 2017-10-28 PROCEDURE — 74011250637 HC RX REV CODE- 250/637: Performed by: INTERNAL MEDICINE

## 2017-10-28 PROCEDURE — 82962 GLUCOSE BLOOD TEST: CPT

## 2017-10-28 PROCEDURE — 74011250637 HC RX REV CODE- 250/637: Performed by: HOSPITALIST

## 2017-10-28 PROCEDURE — 74011636637 HC RX REV CODE- 636/637: Performed by: HOSPITALIST

## 2017-10-28 PROCEDURE — 74011250636 HC RX REV CODE- 250/636: Performed by: INTERNAL MEDICINE

## 2017-10-28 RX ORDER — LISINOPRIL 10 MG/1
10 TABLET ORAL DAILY
Qty: 30 TAB | Refills: 0 | Status: SHIPPED | OUTPATIENT
Start: 2017-10-29 | End: 2017-11-28

## 2017-10-28 RX ORDER — AMOXICILLIN AND CLAVULANATE POTASSIUM 500; 125 MG/1; MG/1
1 TABLET, FILM COATED ORAL EVERY 12 HOURS
Qty: 60 TAB | Refills: 0 | Status: SHIPPED | OUTPATIENT
Start: 2017-10-28 | End: 2017-11-27

## 2017-10-28 RX ADMIN — INSULIN LISPRO 6 UNITS: 100 INJECTION, SOLUTION INTRAVENOUS; SUBCUTANEOUS at 08:20

## 2017-10-28 RX ADMIN — AMOXICILLIN AND CLAVULANATE POTASSIUM 1 TABLET: 500; 125 TABLET, FILM COATED ORAL at 08:20

## 2017-10-28 RX ADMIN — LISINOPRIL 10 MG: 5 TABLET ORAL at 08:20

## 2017-10-28 RX ADMIN — INFLUENZA VIRUS VACCINE 0.5 ML: 15; 15; 15; 15 SUSPENSION INTRAMUSCULAR at 10:53

## 2017-10-28 RX ADMIN — ACETAMINOPHEN 650 MG: 325 TABLET ORAL at 08:30

## 2017-10-28 NOTE — PROGRESS NOTES
Physician Discharge Summary       Patient: Fer German MRN: [id number]  SSN: [id number]    YOB: 1975  Age: 38 y.o. Sex: female    PCP: PROVIDER UNKNOWN    Admit date: 10/26/2017  Admitting Provider: Fabricio Dimas DO    Discharge date: 10/28/2017  Discharging Provider: Sugar Huffman MD    * Admission Diagnoses: Sepsis [facility])    * Discharge Diagnoses:    Hospital Problems as of 10/28/2017  Never Reviewed          Codes Class Noted - Resolved POA    Strep throat ICD-10-CM: J02.0  ICD-9-CM: 034.0  10/28/2017 - Present Yes        DM type 2 (diabetes mellitus, type 2) ([facility] ICD-10-CM: E11.9  ICD-9-CM: 250.00  10/27/2017 - Present Yes        Essential hypertension ICD-10-CM: I10  ICD-9-CM: 401.9  10/27/2017 - Present Yes        * (Principal)RESOLVED: SIRS (systemic inflammatory response syndrome) ([facility] ICD-10-CM: R65.10  ICD-9-CM: 995.90  10/26/2017 - 10/28/2017 Yes              * Hospital Course: 42F with prior hx of Dm on Metformin and HTN Lisinopril which were Dced about 3 years ago when patient lost weight and her blood glucose and BP improved. Patient presented to the ER with fever, b/l leg weakness that resolved when fever abated. In ER patient was tachycardic which persisted despite IVF. Patient was admitted to the hospitalist service. She was initially started on on broad spectrum antibiotics. Once throat culture grew Strep, Levofloxacin was changed to Augmentin. Blood glucose was elevated, A1c checked and it was 8. Metformin was resumed at discharged. While in hospital that was on SSI with AC HS FSBG. Blood pressure was also elevated so Lisinopril was resumed. Patient is clinically improved and is stable to be Dced home.        * Procedures:   * No surgery found *      Consults: None    Significant Diagnostic Studies: labs:   Recent Results (from the past 24 hour(s))   LACTIC ACID    Collection Time: 10/27/17  3:01 PM   Result Value Ref Range    Lactic acid 0.3 (L) 0.4 - 2.0 MMOL/L   METABOLIC PANEL, BASIC    Collection Time: 10/27/17  3:01 PM   Result Value Ref Range    Sodium 138 136 - 145 mmol/L    Potassium 3.5 3.5 - 5.1 mmol/L    Chloride 108 (H) 98 - 107 mmol/L    CO2 19 (L) 21 - 32 mmol/L    Anion gap 11 7 - 16 mmol/L    Glucose 202 (H) 65 - 100 mg/dL    BUN 11 6 - 23 MG/DL    Creatinine 0.66 0.6 - 1.0 MG/DL    GFR est AA >60 >60 ml/min/1.73m2    GFR est non-AA >60 >60 ml/min/1.73m2    Calcium 8.2 (L) 8.3 - 10.4 MG/DL   GLUCOSE, POC    Collection Time: 10/27/17  4:38 PM   Result Value Ref Range    Glucose (POC) 185 (H) 65 - 100 mg/dL   GLUCOSE, POC    Collection Time: 10/27/17  9:45 PM   Result Value Ref Range    Glucose (POC) 189 (H) 65 - 209 mg/dL   METABOLIC PANEL, COMPREHENSIVE    Collection Time: 10/28/17  4:47 AM   Result Value Ref Range    Sodium 136 136 - 145 mmol/L    Potassium 3.4 (L) 3.5 - 5.1 mmol/L    Chloride 104 98 - 107 mmol/L    CO2 20 (L) 21 - 32 mmol/L    Anion gap 12 7 - 16 mmol/L    Glucose 177 (H) 65 - 100 mg/dL    BUN 10 6 - 23 MG/DL    Creatinine 0.60 0.6 - 1.0 MG/DL    GFR est AA >60 >60 ml/min/1.73m2    GFR est non-AA >60 >60 ml/min/1.73m2    Calcium 8.1 (L) 8.3 - 10.4 MG/DL    Bilirubin, total 0.5 0.2 - 1.1 MG/DL    ALT (SGPT) 37 12 - 65 U/L    AST (SGOT) 39 (H) 15 - 37 U/L    Alk.  phosphatase 81 50 - 136 U/L    Protein, total 6.9 6.3 - 8.2 g/dL    Albumin 2.6 (L) 3.5 - 5.0 g/dL    Globulin 4.3 (H) 2.3 - 3.5 g/dL    A-G Ratio 0.6 (L) 1.2 - 3.5     CBC WITH AUTOMATED DIFF    Collection Time: 10/28/17  4:47 AM   Result Value Ref Range    WBC 11.8 (H) 4.3 - 11.1 K/uL    RBC 4.32 4.05 - 5.25 M/uL    HGB 12.6 11.7 - 15.4 g/dL    HCT 37.3 35.8 - 46.3 %    MCV 86.3 79.6 - 97.8 FL    MCH 29.2 26.1 - 32.9 PG    MCHC 33.8 31.4 - 35.0 g/dL    RDW 13.0 11.9 - 14.6 %    PLATELET 979 772 - 279 K/uL    MPV 9.9 (L) 10.8 - 14.1 FL    DF AUTOMATED      NEUTROPHILS 77 43 - 78 %    LYMPHOCYTES 14 13 - 44 %    MONOCYTES 8 4.0 - 12.0 %    EOSINOPHILS 0 (L) 0.5 - 7.8 % BASOPHILS 0 0.0 - 2.0 %    IMMATURE GRANULOCYTES 1 0.0 - 5.0 %    ABS. NEUTROPHILS 9.2 (H) 1.7 - 8.2 K/UL    ABS. LYMPHOCYTES 1.6 0.5 - 4.6 K/UL    ABS. MONOCYTES 0.9 0.1 - 1.3 K/UL    ABS. EOSINOPHILS 0.0 0.0 - 0.8 K/UL    ABS. BASOPHILS 0.0 0.0 - 0.2 K/UL    ABS. IMM. GRANS. 0.1 0.0 - 0.5 K/UL   GLUCOSE, POC    Collection Time: 10/28/17  6:13 AM   Result Value Ref Range    Glucose (POC) 256 (H) 65 - 100 mg/dL         Discharge Exam:  Visit Vitals    /82    Pulse 97    Temp 98.5 °F (36.9 °C)    Resp 19    Ht 4' 10\" (1.473 m)    Wt 63.5 kg (140 lb)    SpO2 96%    BMI 29.26 kg/m2     General appearance: alert, cooperative, no distress, appears stated age  Throat: Lips, mucosa, and tongue normal. Teeth and gums normal  Lungs: clear to auscultation bilaterally  Heart: regular rate and rhythm, S1, S2 normal, no murmur, click, rub or gallop  Extremities: extremities normal, atraumatic, no cyanosis or edema    * Discharge Condition: good and stable  * Disposition: Home    Discharge Medications:  Current Discharge Medication List      START taking these medications    Details   amoxicillin-clavulanate (AUGMENTIN) 500-125 mg per tablet Take 1 Tab by mouth every twelve (12) hours for 30 days. Qty: 60 Tab, Refills: 0      lisinopril (PRINIVIL, ZESTRIL) 10 mg tablet Take 1 Tab by mouth daily for 30 days. Qty: 30 Tab, Refills: 0      metFORMIN (GLUCOPHAGE) 1,000 mg tablet Take 1 Tab by mouth two (2) times daily (with meals) for 30 days. Qty: 60 Tab, Refills: 0      Blood-Glucose Meter monitoring kit Check blood glucose at least once a day,  Qty: 1 Kit, Refills: 0      glucose blood VI test strips (ASCENSIA AUTODISC VI, ONE TOUCH ULTRA TEST VI) strip 1 strip daily  Qty: 50 Strip, Refills: 0             * Follow-up Care/Patient Instructions:   Activity: Activity as tolerated  Diet: Diabetic Diet  Wound Care: None needed    Follow-up Information     Follow up With Details Comments Contact Info    PCP 3-5 days Time spent coordinating DC: 35mins    Signed:  Jenny Valverde MD  10/28/2017  9:50 AM

## 2017-10-28 NOTE — PROGRESS NOTES
Problem: Falls - Risk of  Goal: *Absence of Falls  Document J Carlos Fall Risk and appropriate interventions in the flowsheet.    Outcome: Progressing Towards Goal  Fall Risk Interventions:

## 2017-10-28 NOTE — PROGRESS NOTES
Discharge teaching complete. Patient verbalized understanding of diet, activity, s/sx as reviewed, and follow up appointment. Rx's and discharge instructions given to patient. IV d/c'd with tip intact and dressing applied.

## 2017-10-28 NOTE — DISCHARGE SUMMARY
Jonah López MD Physician Signed Internal Medicine Progress Notes Date of Service: 10/28/17 2522      Expand All Collapse All    []Hide copied text       Physician Discharge Summary         Patient: Lucy Villareal MRN: [id number]  SSN: [id number]    YOB: 1975  Age: 38 y.o. Sex: female    PCP: PROVIDER UNKNOWN     Admit date: 10/26/2017  Admitting Provider: Celena Will DO     Discharge date: 10/28/2017  Discharging Provider: Jonah López MD     * Admission Diagnoses: Sepsis [facility])     * Discharge Diagnoses:    Hospital Problems as of 10/28/2017  Never Reviewed             Codes Class Noted - Resolved POA     Strep throat ICD-10-CM: J02.0  ICD-9-CM: 034.0   10/28/2017 - Present Yes           DM type 2 (diabetes mellitus, type 2) ([facility] ICD-10-CM: E11.9  ICD-9-CM: 250.00   10/27/2017 - Present Yes           Essential hypertension ICD-10-CM: I10  ICD-9-CM: 797. 9   10/27/2017 - Present Yes           * (Principal)RESOLVED: SIRS (systemic inflammatory response syndrome) ([facility]) ICD-10-CM: R65.10  ICD-9-CM: 995.90   10/26/2017 - 10/28/2017 Yes                  * Hospital Course: 42F with prior hx of Dm on Metformin and HTN Lisinopril which were Dced about 3 years ago when patient lost weight and her blood glucose and BP improved. Patient presented to the ER with fever, b/l leg weakness that resolved when fever abated. In ER patient was tachycardic which persisted despite IVF. Patient was admitted to the hospitalist service. She was initially started on on broad spectrum antibiotics. Once throat culture grew Strep, Levofloxacin was changed to Augmentin. Blood glucose was elevated, A1c checked and it was 8. Metformin was resumed at discharged. While in hospital that was on SSI with AC HS FSBG. Blood pressure was also elevated so Lisinopril was resumed.    Patient is clinically improved and is stable to be Dced home.         * Procedures:   * No surgery found *        Consults: None     Significant Diagnostic Studies: labs:    Recent Results          Recent Results (from the past 24 hour(s))   LACTIC ACID     Collection Time: 10/27/17  3:01 PM   Result Value Ref Range     Lactic acid 0.3 (L) 0.4 - 2.0 MMOL/L   METABOLIC PANEL, BASIC     Collection Time: 10/27/17  3:01 PM   Result Value Ref Range     Sodium 138 136 - 145 mmol/L     Potassium 3.5 3.5 - 5.1 mmol/L     Chloride 108 (H) 98 - 107 mmol/L     CO2 19 (L) 21 - 32 mmol/L     Anion gap 11 7 - 16 mmol/L     Glucose 202 (H) 65 - 100 mg/dL     BUN 11 6 - 23 MG/DL     Creatinine 0.66 0.6 - 1.0 MG/DL     GFR est AA >60 >60 ml/min/1.73m2     GFR est non-AA >60 >60 ml/min/1.73m2     Calcium 8.2 (L) 8.3 - 10.4 MG/DL   GLUCOSE, POC     Collection Time: 10/27/17  4:38 PM   Result Value Ref Range     Glucose (POC) 185 (H) 65 - 100 mg/dL   GLUCOSE, POC     Collection Time: 10/27/17  9:45 PM   Result Value Ref Range     Glucose (POC) 189 (H) 65 - 663 mg/dL   METABOLIC PANEL, COMPREHENSIVE     Collection Time: 10/28/17  4:47 AM   Result Value Ref Range     Sodium 136 136 - 145 mmol/L     Potassium 3.4 (L) 3.5 - 5.1 mmol/L     Chloride 104 98 - 107 mmol/L     CO2 20 (L) 21 - 32 mmol/L     Anion gap 12 7 - 16 mmol/L     Glucose 177 (H) 65 - 100 mg/dL     BUN 10 6 - 23 MG/DL     Creatinine 0.60 0.6 - 1.0 MG/DL     GFR est AA >60 >60 ml/min/1.73m2     GFR est non-AA >60 >60 ml/min/1.73m2     Calcium 8.1 (L) 8.3 - 10.4 MG/DL     Bilirubin, total 0.5 0.2 - 1.1 MG/DL     ALT (SGPT) 37 12 - 65 U/L     AST (SGOT) 39 (H) 15 - 37 U/L     Alk.  phosphatase 81 50 - 136 U/L     Protein, total 6.9 6.3 - 8.2 g/dL     Albumin 2.6 (L) 3.5 - 5.0 g/dL     Globulin 4.3 (H) 2.3 - 3.5 g/dL     A-G Ratio 0.6 (L) 1.2 - 3.5     CBC WITH AUTOMATED DIFF     Collection Time: 10/28/17  4:47 AM   Result Value Ref Range     WBC 11.8 (H) 4.3 - 11.1 K/uL     RBC 4.32 4.05 - 5.25 M/uL     HGB 12.6 11.7 - 15.4 g/dL     HCT 37.3 35.8 - 46.3 %     MCV 86.3 79.6 - 97.8 FL     MCH 29.2 26.1 - 32.9 PG     MCHC 33.8 31.4 - 35.0 g/dL     RDW 13.0 11.9 - 14.6 %     PLATELET 320 427 - 460 K/uL     MPV 9.9 (L) 10.8 - 14.1 FL     DF AUTOMATED        NEUTROPHILS 77 43 - 78 %     LYMPHOCYTES 14 13 - 44 %     MONOCYTES 8 4.0 - 12.0 %     EOSINOPHILS 0 (L) 0.5 - 7.8 %     BASOPHILS 0 0.0 - 2.0 %     IMMATURE GRANULOCYTES 1 0.0 - 5.0 %     ABS. NEUTROPHILS 9.2 (H) 1.7 - 8.2 K/UL     ABS. LYMPHOCYTES 1.6 0.5 - 4.6 K/UL     ABS. MONOCYTES 0.9 0.1 - 1.3 K/UL     ABS. EOSINOPHILS 0.0 0.0 - 0.8 K/UL     ABS. BASOPHILS 0.0 0.0 - 0.2 K/UL     ABS. IMM. GRANS. 0.1 0.0 - 0.5 K/UL   GLUCOSE, POC     Collection Time: 10/28/17  6:13 AM   Result Value Ref Range     Glucose (POC) 256 (H) 65 - 100 mg/dL               Discharge Exam:       Visit Vitals    /82    Pulse 97    Temp 98.5 °F (36.9 °C)    Resp 19    Ht 4' 10\" (1.473 m)    Wt 63.5 kg (140 lb)    SpO2 96%    BMI 29.26 kg/m2      General appearance: alert, cooperative, no distress, appears stated age  Throat: Lips, mucosa, and tongue normal. Teeth and gums normal  Lungs: clear to auscultation bilaterally  Heart: regular rate and rhythm, S1, S2 normal, no murmur, click, rub or gallop  Extremities: extremities normal, atraumatic, no cyanosis or edema     * Discharge Condition: good and stable  * Disposition: Home     Discharge Medications:      Current Discharge Medication List           START taking these medications     Details   amoxicillin-clavulanate (AUGMENTIN) 500-125 mg per tablet Take 1 Tab by mouth every twelve (12) hours for 30 days. Qty: 60 Tab, Refills: 0       lisinopril (PRINIVIL, ZESTRIL) 10 mg tablet Take 1 Tab by mouth daily for 30 days. Qty: 30 Tab, Refills: 0       metFORMIN (GLUCOPHAGE) 1,000 mg tablet Take 1 Tab by mouth two (2) times daily (with meals) for 30 days.   Qty: 60 Tab, Refills: 0       Blood-Glucose Meter monitoring kit Check blood glucose at least once a day,  Qty: 1 Kit, Refills: 0       glucose blood VI test strips (ASCENSIA AUTODISC VI, ONE TOUCH ULTRA TEST VI) strip 1 strip daily  Qty: 50 Strip, Refills: 0                 * Follow-up Care/Patient Instructions:   Activity: Activity as tolerated  Diet: Diabetic Diet  Wound Care: None needed     Follow-up Information     Follow up With Details Comments Contact Info     PCP 3-5 days             Time spent coordinating DC: 35mins     Signed:  Jenny Valverde MD  10/28/2017  9:50 AM

## 2017-10-28 NOTE — PROGRESS NOTES
END OF SHIFT NOTE:    INTAKE/OUTPUT  10/27 0701 - 10/28 0700  In: 448 [I.V.:448]  Out: 1400 [Urine:1400]  Voiding: YES  Catheter: NO  Drain:              Flatus: Patient does have flatus present. Stool:  0 occurrences. Characteristics:       Emesis: 0 occurrences. Characteristics:        VITAL SIGNS  Patient Vitals for the past 12 hrs:   Temp Pulse Resp BP SpO2   10/28/17 0001 100.3 °F (37.9 °C) (!) 102 18 133/86 97 %   10/27/17 2043 100.3 °F (37.9 °C) (!) 118 17 144/86 99 %       Pain Assessment  Pain Intensity 1: 0 (10/28/17 0426)  Pain Location 1: Leg     Patient Stated Pain Goal: 0    Ambulating  Yes    Shift report given to oncoming nurse at the bedside.     Vannessa Luu RN

## 2017-10-28 NOTE — DISCHARGE INSTRUCTIONS
DISCHARGE SUMMARY from Nurse    PATIENT INSTRUCTIONS:    After general anesthesia or intravenous sedation, for 24 hours or while taking prescription Narcotics:  · Limit your activities  · Do not drive and operate hazardous machinery  · Do not make important personal or business decisions  · Do  not drink alcoholic beverages  · If you have not urinated within 8 hours after discharge, please contact your surgeon on call. Report the following to your surgeon:  · Excessive pain, swelling, redness or odor of or around the surgical area  · Temperature over 100.5  · Nausea and vomiting lasting longer than 4 hours or if unable to take medications  · Any signs of decreased circulation or nerve impairment to extremity: change in color, persistent  numbness, tingling, coldness or increase pain  · Any questions    What to do at Home:  Recommended activity: Activity as tolerated     if you experience any of the following symptoms fever of 101 or greater that doesn't get better with Tylenol, pain unrelieved by medication, uncontrollable nausea or vomiting please follow up with PCP. *  Please give a list of your current medications to your Primary Care Provider. *  Please update this list whenever your medications are discontinued, doses are      changed, or new medications (including over-the-counter products) are added. *  Please carry medication information at all times in case of emergency situations. These are general instructions for a healthy lifestyle:    No smoking/ No tobacco products/ Avoid exposure to second hand smoke  Surgeon General's Warning:  Quitting smoking now greatly reduces serious risk to your health.     Obesity, smoking, and sedentary lifestyle greatly increases your risk for illness    A healthy diet, regular physical exercise & weight monitoring are important for maintaining a healthy lifestyle    You may be retaining fluid if you have a history of heart failure or if you experience any of the following symptoms:  Weight gain of 3 pounds or more overnight or 5 pounds in a week, increased swelling in our hands or feet or shortness of breath while lying flat in bed. Please call your doctor as soon as you notice any of these symptoms; do not wait until your next office visit. Recognize signs and symptoms of STROKE:    F-face looks uneven    A-arms unable to move or move unevenly    S-speech slurred or non-existent    T-time-call 911 as soon as signs and symptoms begin-DO NOT go       Back to bed or wait to see if you get better-TIME IS BRAIN. Warning Signs of HEART ATTACK     Call 911 if you have these symptoms:   Chest discomfort. Most heart attacks involve discomfort in the center of the chest that lasts more than a few minutes, or that goes away and comes back. It can feel like uncomfortable pressure, squeezing, fullness, or pain.  Discomfort in other areas of the upper body. Symptoms can include pain or discomfort in one or both arms, the back, neck, jaw, or stomach.  Shortness of breath with or without chest discomfort.  Other signs may include breaking out in a cold sweat, nausea, or lightheadedness. Don't wait more than five minutes to call 911 - MINUTES MATTER! Fast action can save your life. Calling 911 is almost always the fastest way to get lifesaving treatment. Emergency Medical Services staff can begin treatment when they arrive -- up to an hour sooner than if someone gets to the hospital by car. The discharge information has been reviewed with the patient. The patient verbalized understanding. Discharge medications reviewed with the patient and appropriate educational materials and side effects teaching were provided. Sepsis: Care Instructions  Your Care Instructions    Sepsis is an infection that has spread throughout your body. It is a life-threatening condition and often causes extremely low blood pressure.  This can lead to problems with many different organs. The cause of sepsis is not always clear, but it can happen as part of a long-term or sudden illness. Sometimes even a mild illness can lead to sepsis. Follow-up care is a key part of your treatment and safety. Be sure to make and go to all appointments, and call your doctor if you are having problems. It's also a good idea to know your test results and keep a list of the medicines you take. How can you care for yourself at home? · If your doctor prescribed antibiotics, take them as directed. Do not stop taking them just because you feel better. You need to take the full course of antibiotics. · Drink plenty of fluids, enough so that your urine is light yellow or clear like water. Choose water or caffeine-free clear liquids until you feel better. If you have kidney, heart, or liver disease and have to limit fluids, talk with your doctor before you increase your fluid intake. You can try rehydration drinks, such as Gatorade or Powerade. · Do not drink alcohol. · Eat a healthy diet. Include fruits, vegetables, and whole grains in your diet every day. · Walking is an easy way to get exercise. Gradually increase the amount you walk every day. Make sure your doctor knows that you are starting an exercise program.  · Do not smoke or use other tobacco products. If you need help quitting, talk to your doctor about stop-smoking programs and medicines. These can increase your chances of quitting for good. When should you call for help? Call 911 anytime you think you may need emergency care. For example, call if:  ? · You passed out (lost consciousness). ?Call your doctor now or seek immediate medical care if:  ? · You have a fever or chills. ? · You have cool, pale, or clammy skin. ? · You are dizzy or lightheaded, or you feel like you may faint. ? · You have any new symptoms, such as a cough, pain in one part of your body, or urinary problems. ? Watch closely for changes in your health, and be sure to contact your doctor if:  ? · You do not get better as expected. Where can you learn more? Go to http://cassandra-anna.info/. Enter P277 in the search box to learn more about \"Sepsis: Care Instructions. \"  Current as of: March 20, 2017  Content Version: 11.4  © 4569-8642 [employer]. Care instructions adapted under license by [employer] (which disclaims liability or warranty for this information). If you have questions about a medical condition or this instruction, always ask your healthcare professional. [address] any warranty or liability for your use of this information. Strep Throat: Care Instructions  Your Care Instructions    Strep throat is a bacterial infection that causes sudden, severe sore throat and fever. Strep throat, which is caused by bacteria called streptococcus, is treated with antibiotics. Sometimes a strep test is necessary to tell if the sore throat is caused by strep bacteria. Treatment can help ease symptoms and may prevent future problems. Follow-up care is a key part of your treatment and safety. Be sure to make and go to all appointments, and call your doctor if you are having problems. It's also a good idea to know your test results and keep a list of the medicines you take. How can you care for yourself at home? · Take your antibiotics as directed. Do not stop taking them just because you feel better. You need to take the full course of antibiotics. · Strep throat can spread to others until 24 hours after you begin taking antibiotics. During this time, you should avoid contact with other people at work or home, especially infants and children. Do not sneeze or cough on others, and wash your hands often. Keep your drinking glass and eating utensils separate from those of others, and wash these items well in hot, soapy water.   · Gargle with warm salt water at least once each hour to help reduce swelling and make your throat feel better. Use 1 teaspoon of salt mixed in 8 fluid ounces of warm water. · Take an over-the-counter pain medication, such as acetaminophen (Tylenol), ibuprofen (Advil, Motrin), or naproxen (Aleve). Read and follow all instructions on the label. · Try an over-the-counter anesthetic throat spray or throat lozenges, which may help relieve throat pain. · Drink plenty of fluids. Fluids may help soothe an irritated throat. Hot fluids, such as tea or soup, may help your throat feel better. · Eat soft solids and drink plenty of clear liquids. Flavored ice pops, ice cream, scrambled eggs, sherbet, and gelatin dessert (such as Jell-O) may also soothe the throat. · Get lots of rest.  · Do not smoke, and avoid secondhand smoke. If you need help quitting, talk to your doctor about stop-smoking programs and medicines. These can increase your chances of quitting for good. · Use a vaporizer or humidifier to add moisture to the air in your bedroom. Follow the directions for cleaning the machine. When should you call for help? Call your doctor now or seek immediate medical care if:  ? · You have a new or higher fever. ? · You have a fever with a stiff neck or severe headache. ? · You have new or worse trouble swallowing. ? · Your sore throat gets much worse on one side. ? · Your pain becomes much worse on one side of your throat. ? Watch closely for changes in your health, and be sure to contact your doctor if:  ? · You are not getting better after 2 days (48 hours). ? · You do not get better as expected. Where can you learn more? Go to http://cassandra-anna.info/. Enter K625 in the search box to learn more about \"Strep Throat: Care Instructions. \"  Current as of: May 12, 2017  Content Version: 11.4  © 1894-5717 [employer]. Care instructions adapted under license by [employer] (which disclaims liability or warranty for this information).  If you have questions about a medical condition or this instruction, always ask your healthcare professional. [address] any warranty or liability for your use of this information.     ___________________________________________________________________________________________________________________________________

## 2017-10-28 NOTE — PROGRESS NOTES
Patient d/c'd off the unit via wheelchair by hospital staff with belongings. No acute distress noted.

## 2017-10-29 LAB
BACTERIA SPEC CULT: ABNORMAL
BACTERIA SPEC CULT: NORMAL
SERVICE CMNT-IMP: ABNORMAL
SERVICE CMNT-IMP: NORMAL

## 2017-10-31 LAB
BACTERIA SPEC CULT: NORMAL
BACTERIA SPEC CULT: NORMAL
SERVICE CMNT-IMP: NORMAL
SERVICE CMNT-IMP: NORMAL

## 2018-10-02 ENCOUNTER — HOSPITAL ENCOUNTER (EMERGENCY)
Age: 43
Discharge: HOME | End: 2018-10-02
Attending: EMERGENCY MEDICINE
Payer: SELF-PAY

## 2018-10-02 VITALS
BODY MASS INDEX: 31.49 KG/M2 | WEIGHT: 150 LBS | DIASTOLIC BLOOD PRESSURE: 86 MMHG | SYSTOLIC BLOOD PRESSURE: 135 MMHG | RESPIRATION RATE: 17 BRPM | HEART RATE: 86 BPM | TEMPERATURE: 98.2 F | HEIGHT: 58 IN | OXYGEN SATURATION: 97 %

## 2018-10-02 DIAGNOSIS — R10.9 ACUTE ABDOMINAL PAIN: Primary | ICD-10-CM

## 2018-10-02 LAB
ALBUMIN SERPL-MCNC: 3.9 G/DL (ref 3.5–5)
ALBUMIN/GLOB SERPL: 0.9 {RATIO} (ref 1.2–3.5)
ALP SERPL-CCNC: 55 U/L (ref 50–136)
ALT SERPL-CCNC: 27 U/L (ref 12–65)
ANION GAP SERPL CALC-SCNC: 8 MMOL/L (ref 7–16)
AST SERPL-CCNC: 28 U/L (ref 15–37)
BASOPHILS # BLD: 0.1 K/UL (ref 0–0.2)
BASOPHILS NFR BLD: 1 % (ref 0–2)
BILIRUB SERPL-MCNC: 0.4 MG/DL (ref 0.2–1.1)
BUN SERPL-MCNC: 17 MG/DL (ref 6–23)
CALCIUM SERPL-MCNC: 8.9 MG/DL (ref 8.3–10.4)
CHLORIDE SERPL-SCNC: 104 MMOL/L (ref 98–107)
CO2 SERPL-SCNC: 27 MMOL/L (ref 21–32)
CREAT SERPL-MCNC: 1.01 MG/DL (ref 0.6–1)
DIFFERENTIAL METHOD BLD: ABNORMAL
EOSINOPHIL # BLD: 0.8 K/UL (ref 0–0.8)
EOSINOPHIL NFR BLD: 7 % (ref 0.5–7.8)
ERYTHROCYTE [DISTWIDTH] IN BLOOD BY AUTOMATED COUNT: 12.2 %
GLOBULIN SER CALC-MCNC: 4.2 G/DL (ref 2.3–3.5)
GLUCOSE SERPL-MCNC: 105 MG/DL (ref 65–100)
HCG UR QL: NEGATIVE
HCT VFR BLD AUTO: 41.9 % (ref 35.8–46.3)
HGB BLD-MCNC: 13.8 G/DL (ref 11.7–15.4)
IMM GRANULOCYTES # BLD: 0 K/UL (ref 0–0.5)
IMM GRANULOCYTES NFR BLD AUTO: 0 % (ref 0–5)
LIPASE SERPL-CCNC: 191 U/L (ref 73–393)
LYMPHOCYTES # BLD: 4 K/UL (ref 0.5–4.6)
LYMPHOCYTES NFR BLD: 34 % (ref 13–44)
MCH RBC QN AUTO: 29.3 PG (ref 26.1–32.9)
MCHC RBC AUTO-ENTMCNC: 32.9 G/DL (ref 31.4–35)
MCV RBC AUTO: 89 FL (ref 79.6–97.8)
MONOCYTES # BLD: 0.8 K/UL (ref 0.1–1.3)
MONOCYTES NFR BLD: 7 % (ref 4–12)
NEUTS SEG # BLD: 6.1 K/UL (ref 1.7–8.2)
NEUTS SEG NFR BLD: 51 % (ref 43–78)
NRBC # BLD: 0 K/UL (ref 0–0.2)
PLATELET # BLD AUTO: 377 K/UL (ref 150–450)
PMV BLD AUTO: 9.7 FL (ref 9.4–12.3)
POTASSIUM SERPL-SCNC: 3.9 MMOL/L (ref 3.5–5.1)
PROT SERPL-MCNC: 8.1 G/DL (ref 6.3–8.2)
RBC # BLD AUTO: 4.71 M/UL (ref 4.05–5.2)
SODIUM SERPL-SCNC: 139 MMOL/L (ref 136–145)
WBC # BLD AUTO: 11.9 K/UL (ref 4.3–11.1)

## 2018-10-02 PROCEDURE — 81025 URINE PREGNANCY TEST: CPT

## 2018-10-02 PROCEDURE — 80053 COMPREHEN METABOLIC PANEL: CPT

## 2018-10-02 PROCEDURE — 74011250637 HC RX REV CODE- 250/637: Performed by: EMERGENCY MEDICINE

## 2018-10-02 PROCEDURE — 83690 ASSAY OF LIPASE: CPT

## 2018-10-02 PROCEDURE — 85025 COMPLETE CBC W/AUTO DIFF WBC: CPT

## 2018-10-02 PROCEDURE — 99284 EMERGENCY DEPT VISIT MOD MDM: CPT | Performed by: EMERGENCY MEDICINE

## 2018-10-02 PROCEDURE — 81003 URINALYSIS AUTO W/O SCOPE: CPT | Performed by: EMERGENCY MEDICINE

## 2018-10-02 RX ORDER — SODIUM CHLORIDE 0.9 % (FLUSH) 0.9 %
5-10 SYRINGE (ML) INJECTION EVERY 8 HOURS
Status: DISCONTINUED | OUTPATIENT
Start: 2018-10-02 | End: 2018-10-03 | Stop reason: HOSPADM

## 2018-10-02 RX ORDER — LISINOPRIL 10 MG/1
10 TABLET ORAL DAILY
COMMUNITY

## 2018-10-02 RX ORDER — SODIUM CHLORIDE 0.9 % (FLUSH) 0.9 %
5-10 SYRINGE (ML) INJECTION AS NEEDED
Status: DISCONTINUED | OUTPATIENT
Start: 2018-10-02 | End: 2018-10-03 | Stop reason: HOSPADM

## 2018-10-02 RX ORDER — LOVASTATIN 20 MG/1
20 TABLET ORAL DAILY
COMMUNITY

## 2018-10-02 RX ORDER — SUCRALFATE 1 G/1
1 TABLET ORAL 3 TIMES DAILY
Qty: 42 TAB | Refills: 0 | Status: SHIPPED | OUTPATIENT
Start: 2018-10-02 | End: 2018-10-16

## 2018-10-02 RX ORDER — PROMETHAZINE HYDROCHLORIDE 25 MG/1
25 TABLET ORAL
Qty: 12 TAB | Refills: 0 | Status: SHIPPED | OUTPATIENT
Start: 2018-10-02

## 2018-10-02 RX ORDER — SUCRALFATE 1 G/10ML
1 SUSPENSION ORAL
Status: COMPLETED | OUTPATIENT
Start: 2018-10-02 | End: 2018-10-02

## 2018-10-02 RX ORDER — METFORMIN HYDROCHLORIDE 500 MG/1
TABLET ORAL 2 TIMES DAILY WITH MEALS
COMMUNITY

## 2018-10-02 RX ORDER — FLUOXETINE HYDROCHLORIDE 20 MG/1
20 CAPSULE ORAL DAILY
COMMUNITY

## 2018-10-02 RX ADMIN — SUCRALFATE 1 G: 1 SUSPENSION ORAL at 22:59

## 2018-10-03 NOTE — ED PROVIDER NOTES
HPI Comments: 19-year-old lady presents with concerns about pain in her epigastric region that she says is gotten worse Since this morning. She says the symptoms initially sarted about a week ago. She does note a metallic taste in her mouth and says it feels like a burning sensation. Patient says that she has had a little bit of nausea and vomiting that has been nonbloody nonbilious. She said she never really had this problem before but she is hard had her gallbladder taken out. She denies any blood in her bowels and has had no fevers or chills. Elements of this note were created using speech recognition software. As such, errors of speech recognition may be present. Patient is a 41 y.o. female presenting with abdominal pain. The history is provided by the patient. Abdominal Pain Associated symptoms include nausea. Pertinent negatives include no fever, no diarrhea, no vomiting, no dysuria, no hematuria, no headaches, no arthralgias, no myalgias and no chest pain. Past Medical History:  
Diagnosis Date  Hypertension Past Surgical History:  
Procedure Laterality Date  HX GYN    
 c-sec x2; hysterectomy History reviewed. No pertinent family history. Social History Social History  Marital status:  Spouse name: N/A  
 Number of children: N/A  
 Years of education: N/A Occupational History  Not on file. Social History Main Topics  Smoking status: Never Smoker  Smokeless tobacco: Never Used  Alcohol use No  
 Drug use: No  
 Sexual activity: Yes Birth control/ protection: Surgical  
 
Other Topics Concern  Not on file Social History Narrative ALLERGIES: Aspirin Review of Systems Constitutional: Negative for chills, diaphoresis and fever. HENT: Negative for congestion, rhinorrhea and sore throat. Eyes: Negative for redness and visual disturbance. Respiratory: Negative for cough, chest tightness, shortness of breath and wheezing. Cardiovascular: Negative for chest pain and palpitations. Gastrointestinal: Positive for abdominal pain and nausea. Negative for blood in stool, diarrhea and vomiting. Endocrine: Negative for polydipsia and polyuria. Genitourinary: Negative for dysuria and hematuria. Musculoskeletal: Negative for arthralgias, myalgias and neck stiffness. Skin: Negative for rash. Allergic/Immunologic: Negative for environmental allergies and food allergies. Neurological: Negative for dizziness, weakness and headaches. Hematological: Negative for adenopathy. Does not bruise/bleed easily. Psychiatric/Behavioral: Negative for confusion and sleep disturbance. The patient is not nervous/anxious. Vitals:  
 10/02/18 2106 10/02/18 2307 BP: 139/87 135/86 Pulse: 85 86 Resp: 16 17 Temp: 98.1 °F (36.7 °C) 98.2 °F (36.8 °C) SpO2: 97% 97% Weight: 68 kg (150 lb) Height: 4' 10\" (1.473 m) Physical Exam  
Constitutional: She is oriented to person, place, and time. She appears well-developed and well-nourished. HENT:  
Head: Normocephalic and atraumatic. Eyes: Conjunctivae and EOM are normal. Pupils are equal, round, and reactive to light. Neck: Normal range of motion. Cardiovascular: Normal rate and regular rhythm. Pulmonary/Chest: Effort normal and breath sounds normal. No respiratory distress. She has no wheezes. She has no rales. She exhibits no tenderness. Abdominal: Soft. Bowel sounds are normal. She exhibits no distension. There is no tenderness. There is no rebound and no guarding. Musculoskeletal: Normal range of motion. She exhibits no edema or tenderness. Lymphadenopathy:  
  She has no cervical adenopathy. Neurological: She is alert and oriented to person, place, and time. Skin: Skin is warm and dry. Psychiatric: She has a normal mood and affect. Nursing note and vitals reviewed. MDM Number of Diagnoses or Management Options Acute abdominal pain:  
Diagnosis management comments: Exam is benign. Blood work is unremarkable. Do not think she needs any other imaging at this time. ED Course Procedures

## 2018-10-03 NOTE — ED TRIAGE NOTES
Burning in abdomen for over one week that was re-occurring. States burning became constant since 0200 today.

## 2018-10-03 NOTE — ED NOTES
I have reviewed discharge instructions with the patient. The patient verbalized understanding. Patient left ED via Discharge Method: ambulatory to Home with daughter. Opportunity for questions and clarification provided. Patient given 2 scripts. To continue your aftercare when you leave the hospital, you may receive an automated call from our care team to check in on how you are doing. This is a free service and part of our promise to provide the best care and service to meet your aftercare needs.  If you have questions, or wish to unsubscribe from this service please call [phone]. Thank you for Choosing our [facility] Emergency Department.

## 2018-10-03 NOTE — ED NOTES
Pt sitting in bed, A+O x4. NAD. Denies cp and sob. Reports pain in abdomen at a 7/10, and reports nausea and vomited once this morning. POC discussed, family at bedside.

## 2019-04-30 PROCEDURE — 99285 EMERGENCY DEPT VISIT HI MDM: CPT | Performed by: EMERGENCY MEDICINE

## 2019-05-01 ENCOUNTER — HOSPITAL ENCOUNTER (EMERGENCY)
Age: 44
Discharge: HOME | End: 2019-05-01
Attending: EMERGENCY MEDICINE
Payer: SELF-PAY

## 2019-05-01 ENCOUNTER — APPOINTMENT (OUTPATIENT)
Dept: CT IMAGING | Age: 44
End: 2019-05-01
Attending: EMERGENCY MEDICINE
Payer: SELF-PAY

## 2019-05-01 VITALS
BODY MASS INDEX: 30.23 KG/M2 | RESPIRATION RATE: 18 BRPM | HEART RATE: 87 BPM | WEIGHT: 144 LBS | SYSTOLIC BLOOD PRESSURE: 125 MMHG | OXYGEN SATURATION: 100 % | TEMPERATURE: 98.2 F | DIASTOLIC BLOOD PRESSURE: 89 MMHG | HEIGHT: 58 IN

## 2019-05-01 DIAGNOSIS — N30.00 ACUTE CYSTITIS WITHOUT HEMATURIA: Primary | ICD-10-CM

## 2019-05-01 DIAGNOSIS — R10.13 ABDOMINAL PAIN, EPIGASTRIC: ICD-10-CM

## 2019-05-01 LAB
ALBUMIN SERPL-MCNC: 3.6 G/DL (ref 3.5–5)
ALBUMIN/GLOB SERPL: 0.9 {RATIO} (ref 1.2–3.5)
ALP SERPL-CCNC: 56 U/L (ref 50–136)
ALT SERPL-CCNC: 23 U/L (ref 12–65)
AMORPH CRY URNS QL MICRO: ABNORMAL
ANION GAP SERPL CALC-SCNC: 5 MMOL/L (ref 7–16)
APPEARANCE UR: ABNORMAL
AST SERPL-CCNC: 28 U/L (ref 15–37)
ATRIAL RATE: 80 BPM
BACTERIA URNS QL MICRO: 0 /HPF
BASOPHILS # BLD: 0.1 K/UL (ref 0–0.2)
BASOPHILS NFR BLD: 1 % (ref 0–2)
BILIRUB SERPL-MCNC: 0.4 MG/DL (ref 0.2–1.1)
BILIRUB UR QL: NEGATIVE
BUN SERPL-MCNC: 15 MG/DL (ref 6–23)
CALCIUM SERPL-MCNC: 9.1 MG/DL (ref 8.3–10.4)
CALCULATED P AXIS, ECG09: 21 DEGREES
CALCULATED R AXIS, ECG10: 16 DEGREES
CALCULATED T AXIS, ECG11: 26 DEGREES
CASTS URNS QL MICRO: ABNORMAL /LPF
CHLORIDE SERPL-SCNC: 106 MMOL/L (ref 98–107)
CO2 SERPL-SCNC: 26 MMOL/L (ref 21–32)
COLOR UR: YELLOW
CREAT SERPL-MCNC: 0.84 MG/DL (ref 0.6–1)
CRYSTALS URNS QL MICRO: ABNORMAL /LPF
DIAGNOSIS, 93000: NORMAL
DIFFERENTIAL METHOD BLD: NORMAL
EOSINOPHIL # BLD: 0.2 K/UL (ref 0–0.8)
EOSINOPHIL NFR BLD: 2 % (ref 0.5–7.8)
EPI CELLS #/AREA URNS HPF: ABNORMAL /HPF
ERYTHROCYTE [DISTWIDTH] IN BLOOD BY AUTOMATED COUNT: 12.3 % (ref 11.9–14.6)
GLOBULIN SER CALC-MCNC: 4.2 G/DL (ref 2.3–3.5)
GLUCOSE SERPL-MCNC: 170 MG/DL (ref 65–100)
GLUCOSE UR STRIP.AUTO-MCNC: 500 MG/DL
HCT VFR BLD AUTO: 39.5 % (ref 35.8–46.3)
HGB BLD-MCNC: 13.3 G/DL (ref 11.7–15.4)
HGB UR QL STRIP: NEGATIVE
IMM GRANULOCYTES # BLD AUTO: 0 K/UL (ref 0–0.5)
IMM GRANULOCYTES NFR BLD AUTO: 0 % (ref 0–5)
KETONES UR QL STRIP.AUTO: ABNORMAL MG/DL
LEUKOCYTE ESTERASE UR QL STRIP.AUTO: ABNORMAL
LIPASE SERPL-CCNC: 163 U/L (ref 73–393)
LYMPHOCYTES # BLD: 3.7 K/UL (ref 0.5–4.6)
LYMPHOCYTES NFR BLD: 38 % (ref 13–44)
MCH RBC QN AUTO: 30 PG (ref 26.1–32.9)
MCHC RBC AUTO-ENTMCNC: 33.7 G/DL (ref 31.4–35)
MCV RBC AUTO: 89 FL (ref 79.6–97.8)
MONOCYTES # BLD: 0.7 K/UL (ref 0.1–1.3)
MONOCYTES NFR BLD: 7 % (ref 4–12)
NEUTS SEG # BLD: 5.1 K/UL (ref 1.7–8.2)
NEUTS SEG NFR BLD: 53 % (ref 43–78)
NITRITE UR QL STRIP.AUTO: NEGATIVE
NRBC # BLD: 0 K/UL (ref 0–0.2)
P-R INTERVAL, ECG05: 134 MS
PH UR STRIP: 5 [PH] (ref 5–9)
PLATELET # BLD AUTO: 352 K/UL (ref 150–450)
PMV BLD AUTO: 10 FL (ref 9.4–12.3)
POTASSIUM SERPL-SCNC: 4.2 MMOL/L (ref 3.5–5.1)
PROT SERPL-MCNC: 7.8 G/DL (ref 6.3–8.2)
PROT UR STRIP-MCNC: ABNORMAL MG/DL
Q-T INTERVAL, ECG07: 354 MS
QRS DURATION, ECG06: 70 MS
QTC CALCULATION (BEZET), ECG08: 408 MS
RBC # BLD AUTO: 4.44 M/UL (ref 4.05–5.2)
SODIUM SERPL-SCNC: 137 MMOL/L (ref 136–145)
SP GR UR REFRACTOMETRY: 1.04 (ref 1–1.02)
TROPONIN I BLD-MCNC: 0 NG/ML (ref 0.02–0.05)
TROPONIN I BLD-MCNC: 0 NG/ML (ref 0.02–0.05)
UROBILINOGEN UR QL STRIP.AUTO: 1 EU/DL (ref 0.2–1)
VENTRICULAR RATE, ECG03: 80 BPM
WBC # BLD AUTO: 9.7 K/UL (ref 4.3–11.1)

## 2019-05-01 PROCEDURE — 80053 COMPREHEN METABOLIC PANEL: CPT

## 2019-05-01 PROCEDURE — 96365 THER/PROPH/DIAG IV INF INIT: CPT | Performed by: EMERGENCY MEDICINE

## 2019-05-01 PROCEDURE — 74177 CT ABD & PELVIS W/CONTRAST: CPT

## 2019-05-01 PROCEDURE — 81001 URINALYSIS AUTO W/SCOPE: CPT

## 2019-05-01 PROCEDURE — 74011636320 HC RX REV CODE- 636/320: Performed by: EMERGENCY MEDICINE

## 2019-05-01 PROCEDURE — 84484 ASSAY OF TROPONIN QUANT: CPT

## 2019-05-01 PROCEDURE — 81003 URINALYSIS AUTO W/O SCOPE: CPT | Performed by: EMERGENCY MEDICINE

## 2019-05-01 PROCEDURE — 74011000258 HC RX REV CODE- 258: Performed by: EMERGENCY MEDICINE

## 2019-05-01 PROCEDURE — 93005 ELECTROCARDIOGRAM TRACING: CPT | Performed by: EMERGENCY MEDICINE

## 2019-05-01 PROCEDURE — 83690 ASSAY OF LIPASE: CPT

## 2019-05-01 PROCEDURE — 74011250637 HC RX REV CODE- 250/637: Performed by: EMERGENCY MEDICINE

## 2019-05-01 PROCEDURE — 74011250636 HC RX REV CODE- 250/636: Performed by: EMERGENCY MEDICINE

## 2019-05-01 PROCEDURE — 85025 COMPLETE CBC W/AUTO DIFF WBC: CPT

## 2019-05-01 RX ORDER — SODIUM CHLORIDE 0.9 % (FLUSH) 0.9 %
10 SYRINGE (ML) INJECTION
Status: COMPLETED | OUTPATIENT
Start: 2019-05-01 | End: 2019-05-01

## 2019-05-01 RX ORDER — CEPHALEXIN 500 MG/1
500 CAPSULE ORAL 2 TIMES DAILY
Qty: 14 CAP | Refills: 0 | Status: SHIPPED | OUTPATIENT
Start: 2019-05-01 | End: 2019-05-08

## 2019-05-01 RX ORDER — SUCRALFATE 1 G/1
1 TABLET ORAL
Status: COMPLETED | OUTPATIENT
Start: 2019-05-01 | End: 2019-05-01

## 2019-05-01 RX ORDER — FAMOTIDINE 20 MG/1
20 TABLET, FILM COATED ORAL 2 TIMES DAILY
Qty: 20 TAB | Refills: 0 | Status: SHIPPED | OUTPATIENT
Start: 2019-05-01 | End: 2019-05-11

## 2019-05-01 RX ADMIN — SODIUM CHLORIDE 100 ML: 900 INJECTION, SOLUTION INTRAVENOUS at 05:12

## 2019-05-01 RX ADMIN — IOPAMIDOL 100 ML: 755 INJECTION, SOLUTION INTRAVENOUS at 05:12

## 2019-05-01 RX ADMIN — Medication 10 ML: at 05:12

## 2019-05-01 RX ADMIN — CEFTRIAXONE SODIUM 1 G: 1 INJECTION, POWDER, FOR SOLUTION INTRAMUSCULAR; INTRAVENOUS at 04:14

## 2019-05-01 RX ADMIN — SUCRALFATE 1 G: 1 TABLET ORAL at 03:42

## 2019-05-01 NOTE — ED TRIAGE NOTES
Pt arrived via POV c/o cp that started around 1830 tonight. Pt states that she is nauseated and still has the cp and pain in the epigastric region.

## 2019-05-01 NOTE — ED PROVIDER NOTES
51-year-old female with history of GERD, hypertension, diabetes presents with complaint of cramping epigastric pain that began around 8 PM after eating. Patient denies chest pain, shortness of breath, dizziness, weakness, fever, chills, melena, medication for consultation, diarrhea. Patient states symptoms are similar to previous episodes of reflux. Reports history of hysterectomy as well as  ×2 and cholecystectomy. The history is provided by the patient. No  was used. Abdominal Pain This is a new problem. The current episode started 3 to 5 hours ago. The problem occurs constantly. The problem has not changed since onset. The pain is located in the epigastric region. The quality of the pain is cramping. The pain is at a severity of 2/10. The pain is mild. Associated symptoms include nausea. Pertinent negatives include no anorexia, no fever, no belching, no diarrhea, no flatus, no hematochezia, no melena, no vomiting, no constipation, no dysuria, no frequency, no hematuria, no headaches, no arthralgias, no myalgias, no chest pain and no back pain. The pain is worsened by eating. The pain is relieved by nothing. Her past medical history is significant for GERD. Past Medical History:  
Diagnosis Date  Hypertension Past Surgical History:  
Procedure Laterality Date  HX GYN    
 c-sec x2; hysterectomy No family history on file. Social History Socioeconomic History  Marital status:  Spouse name: Not on file  Number of children: Not on file  Years of education: Not on file  Highest education level: Not on file Occupational History  Not on file Social Needs  Financial resource strain: Not on file  Food insecurity:  
  Worry: Not on file Inability: Not on file  Transportation needs:  
  Medical: Not on file Non-medical: Not on file Tobacco Use  Smoking status: Never Smoker  Smokeless tobacco: Never Used Substance and Sexual Activity  Alcohol use: No  
 Drug use: No  
 Sexual activity: Yes Birth control/protection: Surgical  
Lifestyle  Physical activity:  
  Days per week: Not on file Minutes per session: Not on file  Stress: Not on file Relationships  Social connections:  
  Talks on phone: Not on file Gets together: Not on file Attends [employer] service: Not on file Active member of club or organization: Not on file Attends meetings of clubs or organizations: Not on file Relationship status: Not on file  Intimate partner violence:  
  Fear of current or ex partner: Not on file Emotionally abused: Not on file Physically abused: Not on file Forced sexual activity: Not on file Other Topics Concern  Not on file Social History Narrative  Not on file ALLERGIES: Aspirin Review of Systems Constitutional: Negative for chills, fatigue and fever. Respiratory: Negative for cough and shortness of breath. Cardiovascular: Negative for chest pain, palpitations and leg swelling. Gastrointestinal: Positive for abdominal pain and nausea. Negative for anorexia, constipation, diarrhea, flatus, hematochezia, melena and vomiting. Genitourinary: Negative for dysuria, frequency, hematuria, vaginal bleeding and vaginal discharge. Musculoskeletal: Negative for arthralgias, back pain and myalgias. Skin: Negative for rash. Neurological: Negative for dizziness, weakness and headaches. Psychiatric/Behavioral: Negative for confusion. Vitals:  
 05/01/19 0000 BP: 127/90 Pulse: 89 Resp: 18 Temp: 98.3 °F (36.8 °C) SpO2: 98% Weight: 65.3 kg (144 lb) Height: 4' 10\" (1.473 m) Physical Exam  
Constitutional: She is oriented to person, place, and time. She appears well-developed and well-nourished. Well appearing and in NAD. HENT:  
Head: Normocephalic. MMM. Eyes: Pupils are equal, round, and reactive to light. Conjunctivae are normal.  
Neck: No JVD present. No tracheal deviation present. Cardiovascular: Normal rate, regular rhythm and normal heart sounds. RRR. Pulses 2+ and equal bilaterally. Pulmonary/Chest: Effort normal.  
CTAB. No wheezes, rhonchi, or rales. Abdominal: Soft. Bowel sounds are normal.  
Soft, ND. No rebound or guarding. No CVAT. Mild epigastric TTP. Musculoskeletal: Normal range of motion. No LE edema. No calf TTP. Neurological: She is alert and oriented to person, place, and time. No cranial nerve deficit. Strength 5/5 throughout. Normal sensory. Skin: Skin is warm and dry. No rash. Nursing note and vitals reviewed. MDM Number of Diagnoses or Management Options Abdominal pain, epigastric: new and requires workup Acute cystitis without hematuria: new and requires workup Diagnosis management comments: Initial repeat troponin negative. EKG normal sinus rhythm. Labs within normal limits. UA with evidence of UTI. Patient given Rocephin IV. CT abdomen and pelvis obtained because on repeat examination patient had mild right lower quadrant abdominal pain. CT scan negative with no acute findings. After CT resulted repeat exam normal.  No rebound or guarding. No right lower quadrant pain. Patient tolerating po. Will discharge him with Pepcid and Keflex and instructions for close outpatient follow-up. Patient given instructions for strict return precautions. Patient in agreement with plan. Amount and/or Complexity of Data Reviewed Clinical lab tests: ordered and reviewed Tests in the radiology section of CPT®: reviewed and ordered Tests in the medicine section of CPT®: ordered and reviewed Review and summarize past medical records: yes Independent visualization of images, tracings, or specimens: yes Risk of Complications, Morbidity, and/or Mortality Presenting problems: moderate Diagnostic procedures: low Patient Progress Patient progress: stable ED Course as of May 01 0614 Wed May 01, 2019  
0531 CT abd/pelvis IMPRESSION: No acute abnormality. [DF] ED Course User Index 
[DF] Shana Bautista MD  
 
 
EKG Date/Time: 5/1/2019 3:29 AM 
Performed by: Shana Bautista MD 
Authorized by: Shana Bautista MD  
 
ECG reviewed by ED Physician in the absence of a cardiologist: yes Rate:  
  ECG rate:  80 ECG rate assessment: normal   
Rhythm:  
  Rhythm: sinus rhythm Ectopy:  
  Ectopy: none QRS:  
  QRS axis:  Normal 
  QRS intervals:  Normal 
Conduction:  
  Conduction: normal   
ST segments: ST segments:  Normal 
T waves:  
  T waves: normal   
 
 
 
Results Include: 
 
Recent Results (from the past 24 hour(s)) CBC WITH AUTOMATED DIFF Collection Time: 05/01/19 12:04 AM  
Result Value Ref Range WBC 9.7 4.3 - 11.1 K/uL  
 RBC 4.44 4.05 - 5.2 M/uL  
 HGB 13.3 11.7 - 15.4 g/dL HCT 39.5 35.8 - 46.3 % MCV 89.0 79.6 - 97.8 FL  
 MCH 30.0 26.1 - 32.9 PG  
 MCHC 33.7 31.4 - 35.0 g/dL  
 RDW 12.3 11.9 - 14.6 % PLATELET 803 351 - 558 K/uL MPV 10.0 9.4 - 12.3 FL ABSOLUTE NRBC 0.00 0.0 - 0.2 K/uL  
 DF AUTOMATED NEUTROPHILS 53 43 - 78 % LYMPHOCYTES 38 13 - 44 % MONOCYTES 7 4.0 - 12.0 % EOSINOPHILS 2 0.5 - 7.8 % BASOPHILS 1 0.0 - 2.0 % IMMATURE GRANULOCYTES 0 0.0 - 5.0 %  
 ABS. NEUTROPHILS 5.1 1.7 - 8.2 K/UL  
 ABS. LYMPHOCYTES 3.7 0.5 - 4.6 K/UL  
 ABS. MONOCYTES 0.7 0.1 - 1.3 K/UL  
 ABS. EOSINOPHILS 0.2 0.0 - 0.8 K/UL  
 ABS. BASOPHILS 0.1 0.0 - 0.2 K/UL  
 ABS. IMM. GRANS. 0.0 0.0 - 0.5 K/UL METABOLIC PANEL, COMPREHENSIVE Collection Time: 05/01/19 12:04 AM  
Result Value Ref Range Sodium 137 136 - 145 mmol/L Potassium 4.2 3.5 - 5.1 mmol/L Chloride 106 98 - 107 mmol/L  
 CO2 26 21 - 32 mmol/L Anion gap 5 (L) 7 - 16 mmol/L Glucose 170 (H) 65 - 100 mg/dL  BUN 15 6 - 23 MG/DL  
 Creatinine 0.84 0.6 - 1.0 MG/DL  
 GFR est AA >60 >60 ml/min/1.73m2 GFR est non-AA >60 >60 ml/min/1.73m2 Calcium 9.1 8.3 - 10.4 MG/DL Bilirubin, total 0.4 0.2 - 1.1 MG/DL  
 ALT (SGPT) 23 12 - 65 U/L  
 AST (SGOT) 28 15 - 37 U/L Alk. phosphatase 56 50 - 136 U/L Protein, total 7.8 6.3 - 8.2 g/dL Albumin 3.6 3.5 - 5.0 g/dL Globulin 4.2 (H) 2.3 - 3.5 g/dL A-G Ratio 0.9 (L) 1.2 - 3.5 LIPASE Collection Time: 05/01/19 12:04 AM  
Result Value Ref Range Lipase 163 73 - 393 U/L  
POC TROPONIN-I Collection Time: 05/01/19 12:04 AM  
Result Value Ref Range Troponin-I (POC) 0 (L) 0.02 - 0.05 ng/ml James Hutchinson MD; 5/1/2019 @3:27 AM Voice dictation software was used during the making of this note. This software is not perfect and grammatical and other typographical errors may be present.   This note has not been proofread for errors. 
===================================================================

## 2019-05-01 NOTE — DISCHARGE INSTRUCTIONS
Follow up with primary care physician in 24-48 hours. Return to ER symptoms worsen or progress in any way. Abdominal Pain: Care Instructions  Your Care Instructions    Abdominal pain has many possible causes. Some aren't serious and get better on their own in a few days. Others need more testing and treatment. If your pain continues or gets worse, you need to be rechecked and may need more tests to find out what is wrong. You may need surgery to correct the problem. Don't ignore new symptoms, such as fever, nausea and vomiting, urination problems, pain that gets worse, and dizziness. These may be signs of a more serious problem. Your doctor may have recommended a follow-up visit in the next 8 to 12 hours. If you are not getting better, you may need more tests or treatment. The doctor has checked you carefully, but problems can develop later. If you notice any problems or new symptoms, get medical treatment right away. Follow-up care is a key part of your treatment and safety. Be sure to make and go to all appointments, and call your doctor if you are having problems. It's also a good idea to know your test results and keep a list of the medicines you take. How can you care for yourself at home? · Rest until you feel better. · To prevent dehydration, drink plenty of fluids, enough so that your urine is light yellow or clear like water. Choose water and other caffeine-free clear liquids until you feel better. If you have kidney, heart, or liver disease and have to limit fluids, talk with your doctor before you increase the amount of fluids you drink. · If your stomach is upset, eat mild foods, such as rice, dry toast or crackers, bananas, and applesauce. Try eating several small meals instead of two or three large ones. · Wait until 48 hours after all symptoms have gone away before you have spicy foods, alcohol, and drinks that contain caffeine. · Do not eat foods that are high in fat.   · Avoid anti-inflammatory medicines such as aspirin, ibuprofen (Advil, Motrin), and naproxen (Aleve). These can cause stomach upset. Talk to your doctor if you take daily aspirin for another health problem. When should you call for help? Call 911 anytime you think you may need emergency care. For example, call if:    · You passed out (lost consciousness).     · You pass maroon or very bloody stools.     · You vomit blood or what looks like coffee grounds.     · You have new, severe belly pain.    Call your doctor now or seek immediate medical care if:    · Your pain gets worse, especially if it becomes focused in one area of your belly.     · You have a new or higher fever.     · Your stools are black and look like tar, or they have streaks of blood.     · You have unexpected vaginal bleeding.     · You have symptoms of a urinary tract infection. These may include:  ? Pain when you urinate. ? Urinating more often than usual.  ? Blood in your urine.     · You are dizzy or lightheaded, or you feel like you may faint.    Watch closely for changes in your health, and be sure to contact your doctor if:    · You are not getting better after 1 day (24 hours). Where can you learn more? Go to http://cassandra-anna.info/. Enter P825 in the search box to learn more about \"Abdominal Pain: Care Instructions. \"  Current as of: September 23, 2018  Content Version: 11.9  © 0983-0355 [email or web address]. Care instructions adapted under license by [employer] (which disclaims liability or warranty for this information). If you have questions about a medical condition or this instruction, always ask your healthcare professional. [place] [address] any warranty or liability for your use of this information. Patient Education        Indigestion (Dyspepsia or Heartburn): Care Instructions  Your Care Instructions  Sometimes it can be hard to pinpoint the cause of indigestion.  (It is also called dyspepsia or heartburn.) Most cases of an upset stomach with bloating, burning, burping, and nausea are minor and go away within several hours. Home treatment and over-the-counter medicine often are able to control symptoms. But if you take medicine to relieve your indigestion without making diet and lifestyle changes, your symptoms are likely to return again and again. If you get indigestion often, it may be a sign of a more serious medical problem. Be sure to follow up with your doctor, who may want to do tests to be sure of the cause of your indigestion. Follow-up care is a key part of your treatment and safety. Be sure to make and go to all appointments, and call your doctor if you are having problems. It's also a good idea to know your test results and keep a list of the medicines you take. How can you care for yourself at home? · Your doctor may recommend over-the-counter medicine. For mild or occasional indigestion, antacids such as Gaviscon, Mylanta, Maalox, or Tums, may help. Be safe with medicines. Be careful when you take over-the-counter antacid medicines. Many of these medicines have aspirin in them. Read the label to make sure that you are not taking more than the recommended dose. Too much aspirin can be harmful. · Your doctor also may recommend over-the-counter acid reducers, such as Pepcid AC, Tagamet HB, Zantac 75, or Prilosec. Read and follow all instructions on the label. If you use these medicines often, talk with your doctor. · Change your eating habits. ? It's best to eat several small meals instead of two or three large meals. ? After you eat, wait 2 to 3 hours before you lie down. ? Chocolate, mint, and alcohol can make GERD worse. ? Spicy foods, foods that have a lot of acid (like tomatoes and oranges), and coffee can make GERD symptoms worse in some people.  If your symptoms are worse after you eat a certain food, you may want to stop eating that food to see if your symptoms get better. · Do not smoke or chew tobacco. Smoking can make GERD worse. If you need help quitting, talk to your doctor about stop-smoking programs and medicines. These can increase your chances of quitting for good. · If you have GERD symptoms at night, raise the head of your bed 6 to 8 inches. You can do this by putting the frame on blocks or placing a foam wedge under the head of your mattress. (Adding extra pillows does not work.)  · Do not wear tight clothing around your middle. · Lose weight if you need to. Losing just 5 to 10 pounds can help. · Do not take anti-inflammatory medicines, such as aspirin, ibuprofen (Advil, Motrin), or naproxen (Aleve). These can irritate the stomach. If you need a pain medicine, try acetaminophen (Tylenol), which does not cause stomach upset. When should you call for help? Call your doctor now or seek immediate medical care if:    · You have new or worse belly pain.     · You are vomiting.    Watch closely for changes in your health, and be sure to contact your doctor if:    · You have new or worse symptoms of indigestion.     · You have trouble or pain swallowing.     · You are losing weight.     · You do not get better as expected. Where can you learn more? Go to http://cassandra-anna.info/. Enter S929 in the search box to learn more about \"Indigestion (Dyspepsia or Heartburn): Care Instructions. \"  Current as of: March 27, 2018  Content Version: 11.9  © 0734-7110 [employer]. Care instructions adapted under license by [employer] (which disclaims liability or warranty for this information). If you have questions about a medical condition or this instruction, always ask your healthcare professional. [place] [address] any warranty or liability for your use of this information.          Patient Education        Urinary Tract Infection in Women: Care Instructions  Your Care Instructions    A urinary tract infection, or UTI, is a general term for an infection anywhere between the kidneys and the urethra (where urine comes out). Most UTIs are bladder infections. They often cause pain or burning when you urinate. UTIs are caused by bacteria and can be cured with antibiotics. Be sure to complete your treatment so that the infection goes away. Follow-up care is a key part of your treatment and safety. Be sure to make and go to all appointments, and call your doctor if you are having problems. It's also a good idea to know your test results and keep a list of the medicines you take. How can you care for yourself at home? · Take your antibiotics as directed. Do not stop taking them just because you feel better. You need to take the full course of antibiotics. · Drink extra water and other fluids for the next day or two. This may help wash out the bacteria that are causing the infection. (If you have kidney, heart, or liver disease and have to limit fluids, talk with your doctor before you increase your fluid intake.)  · Avoid drinks that are carbonated or have caffeine. They can irritate the bladder. · Urinate often. Try to empty your bladder each time. · To relieve pain, take a hot bath or lay a heating pad set on low over your lower belly or genital area. Never go to sleep with a heating pad in place. To prevent UTIs  · Drink plenty of water each day. This helps you urinate often, which clears bacteria from your system. (If you have kidney, heart, or liver disease and have to limit fluids, talk with your doctor before you increase your fluid intake.)  · Urinate when you need to. · Urinate right after you have sex. · Change sanitary pads often. · Avoid douches, bubble baths, feminine hygiene sprays, and other feminine hygiene products that have deodorants. · After going to the bathroom, wipe from front to back. When should you call for help?   Call your doctor now or seek immediate medical care if:    · Symptoms such as fever, chills, nausea, or vomiting get worse or appear for the first time.     · You have new pain in your back just below your rib cage. This is called flank pain.     · There is new blood or pus in your urine.     · You have any problems with your antibiotic medicine.    Watch closely for changes in your health, and be sure to contact your doctor if:    · You are not getting better after taking an antibiotic for 2 days.     · Your symptoms go away but then come back. Where can you learn more? Go to http://cassandra-anna.info/. Enter Q719 in the search box to learn more about \"Urinary Tract Infection in Women: Care Instructions. \"  Current as of: March 20, 2018  Content Version: 11.9  © 2398-4525 [employer]. Care instructions adapted under license by [employer] (which disclaims liability or warranty for this information). If you have questions about a medical condition or this instruction, always ask your healthcare professional. [place] [address] any warranty or liability for your use of this information.

## 2019-05-01 NOTE — ED NOTES
I have reviewed discharge instructions with the patient. The patient verbalized understanding. Patient left ED via Discharge Method: ambulatory to Home with self. Opportunity for questions and clarification provided. Patient given 2 scripts. To continue your aftercare when you leave the hospital, you may receive an automated call from our care team to check in on how you are doing. This is a free service and part of our promise to provide the best care and service to meet your aftercare needs.  If you have questions, or wish to unsubscribe from this service please call [phone]. Thank you for Choosing our [facility].

## 2019-10-22 ENCOUNTER — HOSPITAL ENCOUNTER (EMERGENCY)
Age: 44
Discharge: HOME | End: 2019-10-23
Attending: EMERGENCY MEDICINE
Payer: SELF-PAY

## 2019-10-22 ENCOUNTER — APPOINTMENT (OUTPATIENT)
Dept: GENERAL RADIOLOGY | Age: 44
End: 2019-10-22
Attending: EMERGENCY MEDICINE
Payer: SELF-PAY

## 2019-10-22 DIAGNOSIS — R06.02 SOB (SHORTNESS OF BREATH): ICD-10-CM

## 2019-10-22 DIAGNOSIS — R21 RASH: Primary | ICD-10-CM

## 2019-10-22 PROCEDURE — 99283 EMERGENCY DEPT VISIT LOW MDM: CPT | Performed by: EMERGENCY MEDICINE

## 2019-10-22 PROCEDURE — 71046 X-RAY EXAM CHEST 2 VIEWS: CPT

## 2019-10-23 VITALS
HEIGHT: 58 IN | WEIGHT: 144 LBS | HEART RATE: 84 BPM | OXYGEN SATURATION: 97 % | RESPIRATION RATE: 16 BRPM | TEMPERATURE: 98.7 F | SYSTOLIC BLOOD PRESSURE: 141 MMHG | BODY MASS INDEX: 30.23 KG/M2 | DIASTOLIC BLOOD PRESSURE: 91 MMHG

## 2019-10-23 RX ORDER — PREDNISONE 20 MG/1
20 TABLET ORAL DAILY
Qty: 5 TAB | Refills: 0 | Status: SHIPPED | OUTPATIENT
Start: 2019-10-23 | End: 2019-10-28

## 2019-10-23 NOTE — DISCHARGE INSTRUCTIONS
Patient Education        Shortness of Breath: Care Instructions  Your Care Instructions  Shortness of breath has many causes. Sometimes conditions such as anxiety can lead to shortness of breath. Some people get mild shortness of breath when they exercise. Trouble breathing also can be a symptom of a serious problem, such as asthma, lung disease, emphysema, heart problems, and pneumonia. If your shortness of breath continues, you may need tests and treatment. Watch for any changes in your breathing and other symptoms. Follow-up care is a key part of your treatment and safety. Be sure to make and go to all appointments, and call your doctor if you are having problems. It's also a good idea to know your test results and keep a list of the medicines you take. How can you care for yourself at home? · Do not smoke or allow others to smoke around you. If you need help quitting, talk to your doctor about stop-smoking programs and medicines. These can increase your chances of quitting for good. · Get plenty of rest and sleep. · Take your medicines exactly as prescribed. Call your doctor if you think you are having a problem with your medicine. · Find healthy ways to deal with stress. ? Exercise daily. ? Get plenty of sleep. ? Eat regularly and well. When should you call for help? Call 911 anytime you think you may need emergency care. For example, call if:    · You have severe shortness of breath.     · You have symptoms of a heart attack. These may include:  ? Chest pain or pressure, or a strange feeling in the chest.  ? Sweating. ? Shortness of breath. ? Nausea or vomiting. ? Pain, pressure, or a strange feeling in the back, neck, jaw, or upper belly or in one or both shoulders or arms. ? Lightheadedness or sudden weakness. ? A fast or irregular heartbeat. After you call 911, the  may tell you to chew 1 adult-strength or 2 to 4 low-dose aspirin. Wait for an ambulance.  Do not try to drive yourself.    Call your doctor now or seek immediate medical care if:    · Your shortness of breath gets worse or you start to wheeze. Wheezing is a high-pitched sound when you breathe.     · You wake up at night out of breath or have to prop your head up on several pillows to breathe.     · You are short of breath after only light activity or while at rest.    Watch closely for changes in your health, and be sure to contact your doctor if:    · You do not get better over the next 1 to 2 days. Where can you learn more? Go to http://cassandra-anna.info/. Enter S780 in the search box to learn more about \"Shortness of Breath: Care Instructions. \"  Current as of: June 9, 2019  Content Version: 12.2  © 9773-2422 [employer]. Care instructions adapted under license by [employer] (which disclaims liability or warranty for this information). If you have questions about a medical condition or this instruction, always ask your healthcare professional. [place] [address] any warranty or liability for your use of this information. Patient Education        Rash: Care Instructions  Your Care Instructions  A rash is any irritation or inflammation of the skin. Rashes have many possible causes, including allergy, infection, illness, heat, and emotional stress. Follow-up care is a key part of your treatment and safety. Be sure to make and go to all appointments, and call your doctor if you are having problems. It's also a good idea to know your test results and keep a list of the medicines you take. How can you care for yourself at home? · Wash the area with water only. Soap can make dryness and itching worse. Pat dry. · Put cold, wet cloths on the rash to reduce itching. · Keep cool, and stay out of the sun. · Leave the rash open to the air as much of the time as possible.   · Sometimes petroleum jelly (Vaseline) can help relieve the discomfort caused by a rash. A moisturizing lotion, such as Cetaphil, also may help. Calamine lotion may help for rashes caused by contact with something (such as a plant or soap) that irritated the skin. Use it 3 or 4 times a day. · If your doctor prescribed a cream, use it as directed. If your doctor prescribed medicine, take it exactly as directed. · If your rash itches so badly that it interferes with your normal activities, take an over-the-counter antihistamine, such as diphenhydramine (Benadryl) or loratadine (Claritin). Read and follow all instructions on the label. When should you call for help? Call your doctor now or seek immediate medical care if:    · You have signs of infection, such as:  ? Increased pain, swelling, warmth, or redness. ? Red streaks leading from the area. ? Pus draining from the area. ? A fever.     · You have joint pain along with the rash.    Watch closely for changes in your health, and be sure to contact your doctor if:    · Your rash is changing or getting worse. For example, call if you have pain along with the rash, the rash is spreading, or you have new blisters.     · You do not get better after 1 week. Where can you learn more? Go to http://cassandra-anna.info/. Enter E419 in the search box to learn more about \"Rash: Care Instructions. \"  Current as of: April 1, 2019  Content Version: 12.2  © 5785-3159 [employer]. Care instructions adapted under license by [employer] (which disclaims liability or warranty for this information). If you have questions about a medical condition or this instruction, always ask your healthcare professional. [place] [address] any warranty or liability for your use of this information.

## 2019-10-23 NOTE — ED NOTES
I have reviewed discharge instructions with the patient. The patient verbalized understanding. Patient left ED via Discharge Method: ambulatory to Home with daughter in law. Opportunity for questions and clarification provided. Patient given 2 scripts. To continue your aftercare when you leave the hospital, you may receive an automated call from our care team to check in on how you are doing. This is a free service and part of our promise to provide the best care and service to meet your aftercare needs.  If you have questions, or wish to unsubscribe from this service please call [phone]. Thank you for Choosing our [facility] Emergency Department.

## 2019-10-23 NOTE — ED TRIAGE NOTES
C/o non-productive cough and shortness of breath. Onset approx 2 weeks pta. Denies fevers. Also reports \"rash\" to right forearm and right hip, right groin. Seen at [facility] 10/13 for same symptoms, prescribed kenalog, inhaler without relief.

## 2019-10-23 NOTE — ED PROVIDER NOTES
Whit Mercado is a 45 y.o. female who presents to the ED with a chief complaint of shortness of breath. Patient has cough for the last 2 to 3 weeks. No fever associated with it. She denies any past lung problems and is a non-smoker. She did go to an med and was given an albuterol inhaler. She also has developed a rash on her buttocks and right arm about 2 to 3 weeks ago as well. She was given some triamcinolone for this and her symptoms have been gradually improving but skin is still very dry around these areas. No other people in the family having any rashes or similar symptoms. Past Medical History:   Diagnosis Date    Hypertension        Past Surgical History:   Procedure Laterality Date    HX GYN      c-sec x2; hysterectomy         No family history on file.     Social History     Socioeconomic History    Marital status:      Spouse name: Not on file    Number of children: Not on file    Years of education: Not on file    Highest education level: Not on file   Occupational History    Not on file   Social Needs    Financial resource strain: Not on file    Food insecurity:     Worry: Not on file     Inability: Not on file    Transportation needs:     Medical: Not on file     Non-medical: Not on file   Tobacco Use    Smoking status: Never Smoker    Smokeless tobacco: Never Used   Substance and Sexual Activity    Alcohol use: No    Drug use: No    Sexual activity: Yes     Birth control/protection: Surgical   Lifestyle    Physical activity:     Days per week: Not on file     Minutes per session: Not on file    Stress: Not on file   Relationships    Social connections:     Talks on phone: Not on file     Gets together: Not on file     Attends [employer] service: Not on file     Active member of club or organization: Not on file     Attends meetings of clubs or organizations: Not on file     Relationship status: Not on file    Intimate partner violence:     Fear of current or ex partner: Not on file     Emotionally abused: Not on file     Physically abused: Not on file     Forced sexual activity: Not on file   Other Topics Concern    Not on file   Social History Narrative    Not on file         ALLERGIES: Aspirin    Review of Systems   Constitutional: Negative for chills, fatigue and fever. Respiratory: Negative for chest tightness, shortness of breath, wheezing and stridor. Cardiovascular: Negative for chest pain, palpitations and leg swelling. Gastrointestinal: Negative for abdominal pain, diarrhea, nausea and vomiting. Musculoskeletal: Negative for arthralgias, neck pain and neck stiffness. Skin: Negative. Negative for color change, rash and wound. All other systems reviewed and are negative. Vitals:    10/22/19 2236   BP: 136/89   Pulse: 98   Resp: 18   Temp: 98.6 °F (37 °C)   SpO2: 98%   Weight: 65.3 kg (144 lb)   Height: 4' 10\" (1.473 m)            Physical Exam   Constitutional: She appears well-developed and well-nourished. No distress. HENT:   Head: Normocephalic and atraumatic. Right Ear: External ear normal.   Left Ear: External ear normal.   Mouth/Throat: Oropharynx is clear and moist.   Eyes: Pupils are equal, round, and reactive to light. Conjunctivae and EOM are normal. No scleral icterus. Neck: Normal range of motion. Neck supple. No thyromegaly present. Cardiovascular: Normal rate, regular rhythm, normal heart sounds and intact distal pulses. Exam reveals no friction rub. No murmur heard. Pulmonary/Chest: Effort normal and breath sounds normal. No stridor. No respiratory distress. She has no wheezes. She has no rales. She exhibits no tenderness. Abdominal: Soft. She exhibits no mass. There is no tenderness. There is no rebound and no guarding. Neurological: She is alert. Skin: She is not diaphoretic. Dry faint erythematous rash to buttocks bilaterally minimal erythema to the right forearm.    Psychiatric: She has a normal mood and affect. Her behavior is normal.   Nursing note and vitals reviewed. MDM  Number of Diagnoses or Management Options  Rash:   SOB (shortness of breath):   Diagnosis management comments: Patient lungs sound are clear her vital signs are normal.   I will treat with short course of prednisone patient advised to follow-up with her pcp and keep close eye on her glucose. Guanako Cunningham MD; 10/23/2019 @1:42 AM Voice dictation software was used during the making of this note. This software is not perfect and grammatical and other typographical errors may be present.   This note has not been proofread for errors.  ===================================================================          Amount and/or Complexity of Data Reviewed  Tests in the radiology section of CPT®: ordered and reviewed           Procedures

## 2020-06-21 ENCOUNTER — APPOINTMENT (OUTPATIENT)
Dept: GENERAL RADIOLOGY | Age: 45
End: 2020-06-21
Attending: EMERGENCY MEDICINE
Payer: SELF-PAY

## 2020-06-21 ENCOUNTER — HOSPITAL ENCOUNTER (EMERGENCY)
Age: 45
Discharge: HOME | End: 2020-06-21
Attending: EMERGENCY MEDICINE
Payer: SELF-PAY

## 2020-06-21 VITALS
TEMPERATURE: 98.2 F | WEIGHT: 149 LBS | HEART RATE: 77 BPM | RESPIRATION RATE: 18 BRPM | BODY MASS INDEX: 31.28 KG/M2 | DIASTOLIC BLOOD PRESSURE: 59 MMHG | SYSTOLIC BLOOD PRESSURE: 121 MMHG | OXYGEN SATURATION: 97 % | HEIGHT: 58 IN

## 2020-06-21 DIAGNOSIS — R07.9 CHEST PAIN, UNSPECIFIED TYPE: Primary | ICD-10-CM

## 2020-06-21 LAB
ALBUMIN SERPL-MCNC: 3 G/DL (ref 3.5–5)
ALBUMIN/GLOB SERPL: 0.7 {RATIO} (ref 1.2–3.5)
ALP SERPL-CCNC: 72 U/L (ref 50–136)
ALT SERPL-CCNC: 20 U/L (ref 12–65)
ANION GAP SERPL CALC-SCNC: 13 MMOL/L (ref 7–16)
AST SERPL-CCNC: 12 U/L (ref 15–37)
ATRIAL RATE: 63 BPM
ATRIAL RATE: 79 BPM
BASOPHILS # BLD: 0.1 K/UL (ref 0–0.2)
BASOPHILS NFR BLD: 1 % (ref 0–2)
BILIRUB SERPL-MCNC: 0.4 MG/DL (ref 0.2–1.1)
BUN SERPL-MCNC: 10 MG/DL (ref 6–23)
CALCIUM SERPL-MCNC: 8.4 MG/DL (ref 8.3–10.4)
CALCULATED P AXIS, ECG09: 32 DEGREES
CALCULATED P AXIS, ECG09: 60 DEGREES
CALCULATED R AXIS, ECG10: 33 DEGREES
CALCULATED R AXIS, ECG10: 54 DEGREES
CALCULATED T AXIS, ECG11: 52 DEGREES
CALCULATED T AXIS, ECG11: 60 DEGREES
CHLORIDE SERPL-SCNC: 105 MMOL/L (ref 98–107)
CO2 SERPL-SCNC: 21 MMOL/L (ref 21–32)
CREAT SERPL-MCNC: 0.78 MG/DL (ref 0.6–1)
D DIMER PPP FEU-MCNC: 0.34 UG/ML(FEU)
DIAGNOSIS, 93000: NORMAL
DIAGNOSIS, 93000: NORMAL
DIFFERENTIAL METHOD BLD: NORMAL
EOSINOPHIL # BLD: 0.1 K/UL (ref 0–0.8)
EOSINOPHIL NFR BLD: 2 % (ref 0.5–7.8)
ERYTHROCYTE [DISTWIDTH] IN BLOOD BY AUTOMATED COUNT: 12.5 % (ref 11.9–14.6)
GLOBULIN SER CALC-MCNC: 4.5 G/DL (ref 2.3–3.5)
GLUCOSE SERPL-MCNC: 152 MG/DL (ref 65–100)
HCT VFR BLD AUTO: 41.1 % (ref 35.8–46.3)
HGB BLD-MCNC: 13.2 G/DL (ref 11.7–15.4)
IMM GRANULOCYTES # BLD AUTO: 0 K/UL (ref 0–0.5)
IMM GRANULOCYTES NFR BLD AUTO: 0 % (ref 0–5)
LIPASE SERPL-CCNC: 104 U/L (ref 73–393)
LYMPHOCYTES # BLD: 2.6 K/UL (ref 0.5–4.6)
LYMPHOCYTES NFR BLD: 32 % (ref 13–44)
MCH RBC QN AUTO: 28.6 PG (ref 26.1–32.9)
MCHC RBC AUTO-ENTMCNC: 32.1 G/DL (ref 31.4–35)
MCV RBC AUTO: 89.2 FL (ref 79.6–97.8)
MONOCYTES # BLD: 0.5 K/UL (ref 0.1–1.3)
MONOCYTES NFR BLD: 6 % (ref 4–12)
NEUTS SEG # BLD: 4.9 K/UL (ref 1.7–8.2)
NEUTS SEG NFR BLD: 60 % (ref 43–78)
NRBC # BLD: 0 K/UL (ref 0–0.2)
P-R INTERVAL, ECG05: 134 MS
P-R INTERVAL, ECG05: 138 MS
PLATELET # BLD AUTO: 365 K/UL (ref 150–450)
PMV BLD AUTO: 9.6 FL (ref 9.4–12.3)
POTASSIUM SERPL-SCNC: 3.9 MMOL/L (ref 3.5–5.1)
PROT SERPL-MCNC: 7.5 G/DL (ref 6.3–8.2)
Q-T INTERVAL, ECG07: 364 MS
Q-T INTERVAL, ECG07: 392 MS
QRS DURATION, ECG06: 68 MS
QRS DURATION, ECG06: 68 MS
QTC CALCULATION (BEZET), ECG08: 401 MS
QTC CALCULATION (BEZET), ECG08: 417 MS
RBC # BLD AUTO: 4.61 M/UL (ref 4.05–5.2)
SODIUM SERPL-SCNC: 139 MMOL/L (ref 136–145)
TROPONIN-HIGH SENSITIVITY: 3.1 PG/ML (ref 0–14)
TROPONIN-HIGH SENSITIVITY: 3.4 PG/ML (ref 0–14)
VENTRICULAR RATE, ECG03: 63 BPM
VENTRICULAR RATE, ECG03: 79 BPM
WBC # BLD AUTO: 8.2 K/UL (ref 4.3–11.1)

## 2020-06-21 PROCEDURE — 85025 COMPLETE CBC W/AUTO DIFF WBC: CPT

## 2020-06-21 PROCEDURE — 80053 COMPREHEN METABOLIC PANEL: CPT

## 2020-06-21 PROCEDURE — 74011000250 HC RX REV CODE- 250: Performed by: EMERGENCY MEDICINE

## 2020-06-21 PROCEDURE — 83690 ASSAY OF LIPASE: CPT

## 2020-06-21 PROCEDURE — 74011250637 HC RX REV CODE- 250/637: Performed by: EMERGENCY MEDICINE

## 2020-06-21 PROCEDURE — 85379 FIBRIN DEGRADATION QUANT: CPT

## 2020-06-21 PROCEDURE — 74011250636 HC RX REV CODE- 250/636: Performed by: EMERGENCY MEDICINE

## 2020-06-21 PROCEDURE — 93005 ELECTROCARDIOGRAM TRACING: CPT | Performed by: EMERGENCY MEDICINE

## 2020-06-21 PROCEDURE — 84484 ASSAY OF TROPONIN QUANT: CPT

## 2020-06-21 PROCEDURE — 96374 THER/PROPH/DIAG INJ IV PUSH: CPT

## 2020-06-21 PROCEDURE — 71045 X-RAY EXAM CHEST 1 VIEW: CPT

## 2020-06-21 PROCEDURE — 99284 EMERGENCY DEPT VISIT MOD MDM: CPT

## 2020-06-21 RX ORDER — ONDANSETRON 2 MG/ML
4 INJECTION INTRAMUSCULAR; INTRAVENOUS
Status: COMPLETED | OUTPATIENT
Start: 2020-06-21 | End: 2020-06-21

## 2020-06-21 RX ORDER — PANTOPRAZOLE SODIUM 40 MG/1
40 TABLET, DELAYED RELEASE ORAL DAILY
Qty: 20 TAB | Refills: 0 | Status: SHIPPED | OUTPATIENT
Start: 2020-06-21 | End: 2020-07-11

## 2020-06-21 RX ORDER — MAG HYDROX/ALUMINUM HYD/SIMETH 200-200-20
30 SUSPENSION, ORAL (FINAL DOSE FORM) ORAL
Status: COMPLETED | OUTPATIENT
Start: 2020-06-21 | End: 2020-06-21

## 2020-06-21 RX ORDER — LIDOCAINE HYDROCHLORIDE 20 MG/ML
15 SOLUTION OROPHARYNGEAL
Status: COMPLETED | OUTPATIENT
Start: 2020-06-21 | End: 2020-06-21

## 2020-06-21 RX ADMIN — ALUMINUM HYDROXIDE, MAGNESIUM HYDROXIDE, AND SIMETHICONE 30 ML: 200; 200; 20 SUSPENSION ORAL at 12:37

## 2020-06-21 RX ADMIN — LIDOCAINE HYDROCHLORIDE 15 ML: 20 SOLUTION ORAL; TOPICAL at 12:08

## 2020-06-21 RX ADMIN — ONDANSETRON 4 MG: 2 INJECTION INTRAMUSCULAR; INTRAVENOUS at 13:44

## 2020-06-21 NOTE — ED PROVIDER NOTES
57-year-old female with history of hypertension, hyperlipidemia, diabetes, family history for coronary disease presents with complaint of substernal chest pressure/burning without radiation that started around 4 AM.  States that she took Tums at home without improvement of symptoms. Denies any personal history of CAD. States that she had a stress test and 2003 that was reportedly normal.  Patient states that she does have history of GERD but the symptoms that she is having currently are different. Denies abdominal pain, vomiting, fever, chills, shortness of breath, hemoptysis, cough, dizziness, weakness, diaphoresis. Patient reports history of aspirin allergy. Patient with history of cholecystectomy. The history is provided by the patient. No  was used. Chest Pain (Angina)    This is a new problem. The current episode started 6 to 12 hours ago. The problem has not changed since onset. The problem occurs constantly. The pain is associated with rest. The pain is present in the substernal region. The pain is at a severity of 2/10. The pain is mild. The quality of the pain is described as dull. The pain does not radiate. Associated symptoms include nausea. Pertinent negatives include no abdominal pain, no back pain, no claudication, no cough, no diaphoresis, no dizziness, no exertional chest pressure, no fever, no headaches, no hemoptysis, no irregular heartbeat, no leg pain, no lower extremity edema, no malaise/fatigue, no near-syncope, no numbness, no orthopnea, no palpitations, no PND, no shortness of breath, no sputum production, no vomiting and no weakness. Risk factors include obesity, diabetes mellitus, hypertension and family history. Past Medical History:   Diagnosis Date    Hypertension        Past Surgical History:   Procedure Laterality Date    HX GYN      c-sec x2; hysterectomy         No family history on file.     Social History     Socioeconomic History    Marital status:      Spouse name: Not on file    Number of children: Not on file    Years of education: Not on file    Highest education level: Not on file   Occupational History    Not on file   Social Needs    Financial resource strain: Not on file    Food insecurity     Worry: Not on file     Inability: Not on file    Transportation needs     Medical: Not on file     Non-medical: Not on file   Tobacco Use    Smoking status: Never Smoker    Smokeless tobacco: Never Used   Substance and Sexual Activity    Alcohol use: No    Drug use: No    Sexual activity: Yes     Birth control/protection: Surgical   Lifestyle    Physical activity     Days per week: Not on file     Minutes per session: Not on file    Stress: Not on file   Relationships    Social connections     Talks on phone: Not on file     Gets together: Not on file     Attends Sabianism service: Not on file     Active member of club or organization: Not on file     Attends meetings of clubs or organizations: Not on file     Relationship status: Not on file    Intimate partner violence     Fear of current or ex partner: Not on file     Emotionally abused: Not on file     Physically abused: Not on file     Forced sexual activity: Not on file   Other Topics Concern    Not on file   Social History Narrative    Not on file         ALLERGIES: Aspirin    Review of Systems   Constitutional: Negative for diaphoresis, fatigue, fever and malaise/fatigue. HENT: Negative for congestion. Respiratory: Negative for cough, hemoptysis, sputum production, shortness of breath and wheezing. Cardiovascular: Positive for chest pain. Negative for palpitations, orthopnea, claudication, PND and near-syncope. Gastrointestinal: Positive for nausea. Negative for abdominal pain, constipation, diarrhea and vomiting. Genitourinary: Negative for dysuria, flank pain and hematuria. Musculoskeletal: Negative for back pain and myalgias. Skin: Negative for rash. Neurological: Negative for dizziness, syncope, weakness, light-headedness, numbness and headaches. Vitals:    06/21/20 1144   BP: 111/68   Pulse: 84   Resp: 16   Temp: 98.2 °F (36.8 °C)   SpO2: 97%   Weight: 67.6 kg (149 lb)   Height: 4' 10\" (1.473 m)            Physical Exam  Vitals signs and nursing note reviewed. Constitutional:       Appearance: She is well-developed. Comments: Sitting up in bed no acute distress at this time. HENT:      Head: Normocephalic. Eyes:      Extraocular Movements: Extraocular movements intact. Pupils: Pupils are equal, round, and reactive to light. Cardiovascular:      Rate and Rhythm: Normal rate and regular rhythm. Heart sounds: Normal heart sounds. Comments: Pulses 2+ and equal throughout. Pulmonary:      Effort: Pulmonary effort is normal.      Breath sounds: Normal breath sounds. Comments: CTAB. Abdominal:      Palpations: Abdomen is soft. Tenderness: There is no abdominal tenderness. There is no guarding or rebound. Comments: Soft, NTND. No rebound or guarding. No CVAT. No epigastric or RUQ TTP. Musculoskeletal: Normal range of motion. Comments: No LE edema. No calf  TTP. Neurological:      General: No focal deficit present. Mental Status: She is alert and oriented to person, place, and time. Motor: No weakness. MDM  Number of Diagnoses or Management Options  Chest pain, unspecified type: new and requires workup  Diagnosis management comments: Vital signs stable. Chest x-ray clear. EKG with normal sinus rhythm. No ischemic changes noted. Labs unremarkable. D-dimer within normal limits. Initial troponin within normal limits. Will be obtaining repeat 3-hour troponin to ensure no trend upward from previous. Repeat EKG with no new changes from previous. Patient with reported aspirin allergy. Patient given GI cocktail and Zofran. Patient reports improvement of symptoms.   If repeat 3-hour troponin unremarkable plan is for discharge home with close outpatient follow-up with [facility] cardiology. Will contact [facility] cardiology referral line to ensure prompt outpatient follow-up for stress test in the next week. Patient given strict return precautions. Amount and/or Complexity of Data Reviewed  Clinical lab tests: ordered and reviewed  Tests in the radiology section of CPT®: ordered and reviewed  Tests in the medicine section of CPT®: ordered and reviewed  Review and summarize past medical records: yes  Independent visualization of images, tracings, or specimens: yes    Risk of Complications, Morbidity, and/or Mortality  Presenting problems: moderate  Diagnostic procedures: moderate  Management options: moderate    Patient Progress  Patient progress: stable         EKG  Date/Time: 6/21/2020 12:11 PM  Performed by: Boy Hutchins MD  Authorized by:  Boy Hutchins MD     ECG reviewed by ED Physician in the absence of a cardiologist: yes    Rate:     ECG rate:  79    ECG rate assessment: normal    Rhythm:     Rhythm: sinus rhythm    Ectopy:     Ectopy: none    QRS:     QRS axis:  Normal    QRS intervals:  Normal  Conduction:     Conduction: normal    ST segments:     ST segments:  Normal  T waves:     T waves: normal              Results Include:    Recent Results (from the past 24 hour(s))   CBC WITH AUTOMATED DIFF    Collection Time: 06/21/20 11:47 AM   Result Value Ref Range    WBC 8.2 4.3 - 11.1 K/uL    RBC 4.61 4.05 - 5.2 M/uL    HGB 13.2 11.7 - 15.4 g/dL    HCT 41.1 35.8 - 46.3 %    MCV 89.2 79.6 - 97.8 FL    MCH 28.6 26.1 - 32.9 PG    MCHC 32.1 31.4 - 35.0 g/dL    RDW 12.5 11.9 - 14.6 %    PLATELET 073 116 - 520 K/uL    MPV 9.6 9.4 - 12.3 FL    ABSOLUTE NRBC 0.00 0.0 - 0.2 K/uL    DF AUTOMATED      NEUTROPHILS 60 43 - 78 %    LYMPHOCYTES 32 13 - 44 %    MONOCYTES 6 4.0 - 12.0 %    EOSINOPHILS 2 0.5 - 7.8 %    BASOPHILS 1 0.0 - 2.0 %    IMMATURE GRANULOCYTES 0 0.0 - 5.0 %    ABS. NEUTROPHILS 4.9 1.7 - 8.2 K/UL    ABS. LYMPHOCYTES 2.6 0.5 - 4.6 K/UL    ABS. MONOCYTES 0.5 0.1 - 1.3 K/UL    ABS. EOSINOPHILS 0.1 0.0 - 0.8 K/UL    ABS. BASOPHILS 0.1 0.0 - 0.2 K/UL    ABS. IMM. GRANS. 0.0 0.0 - 0.5 K/UL   METABOLIC PANEL, COMPREHENSIVE    Collection Time: 06/21/20 11:47 AM   Result Value Ref Range    Sodium 139 136 - 145 mmol/L    Potassium 3.9 3.5 - 5.1 mmol/L    Chloride 105 98 - 107 mmol/L    CO2 21 21 - 32 mmol/L    Anion gap 13 7 - 16 mmol/L    Glucose 152 (H) 65 - 100 mg/dL    BUN 10 6 - 23 MG/DL    Creatinine 0.78 0.6 - 1.0 MG/DL    GFR est AA >60 >60 ml/min/1.73m2    GFR est non-AA >60 >60 ml/min/1.73m2    Calcium 8.4 8.3 - 10.4 MG/DL    Bilirubin, total 0.4 0.2 - 1.1 MG/DL    ALT (SGPT) 20 12 - 65 U/L    AST (SGOT) 12 (L) 15 - 37 U/L    Alk. phosphatase 72 50 - 136 U/L    Protein, total 7.5 6.3 - 8.2 g/dL    Albumin 3.0 (L) 3.5 - 5.0 g/dL    Globulin 4.5 (H) 2.3 - 3.5 g/dL    A-G Ratio 0.7 (L) 1.2 - 3.5     TROPONIN-HIGH SENSITIVITY    Collection Time: 06/21/20 11:47 AM   Result Value Ref Range    Troponin-High Sensitivity 3.1 0 - 14 pg/mL   D DIMER    Collection Time: 06/21/20 11:47 AM   Result Value Ref Range    D DIMER 0.34 <0.56 ug/ml(FEU)   LIPASE    Collection Time: 06/21/20 11:47 AM   Result Value Ref Range    Lipase 104 73 - 393 U/L   EKG, 12 LEAD, SUBSEQUENT    Collection Time: 06/21/20  2:44 PM   Result Value Ref Range    Ventricular Rate 63 BPM    Atrial Rate 63 BPM    P-R Interval 134 ms    QRS Duration 68 ms    Q-T Interval 392 ms    QTC Calculation (Bezet) 401 ms    Calculated P Axis 60 degrees    Calculated R Axis 54 degrees    Calculated T Axis 60 degrees    Diagnosis       Normal sinus rhythm  Low voltage QRS  Borderline ECG  When compared with ECG of 21-JUN-2020 11:43,  No significant change was found                Jared Arteaga MD; 6/21/2020 @12:11 PM Voice dictation software was used during the making of this note.   This software is not perfect and grammatical and other typographical errors may be present.   This note has not been proofread for errors.  ===================================================================

## 2020-06-21 NOTE — ED TRIAGE NOTES
Patient arrives ambulatory to triage with mask in place. Patient reports waking up with midsternal chest pressure at 0400. Patient reports concern for heart burn and took tums this morning without relief. Patient denies shortness of breath, reports some nausea. Reports exhaustion.

## 2020-06-21 NOTE — ED NOTES
I have reviewed discharge instructions with the patient. The patient verbalized understanding. Patient left ED via Discharge Method: ambulatory to Home with self. Opportunity for questions and clarification provided. Patient given 1 scripts. To continue your aftercare when you leave the hospital, you may receive an automated call from our care team to check in on how you are doing. This is a free service and part of our promise to provide the best care and service to meet your aftercare needs.  If you have questions, or wish to unsubscribe from this service please call [phone]. Thank you for Choosing our [facility] Emergency Department.

## 2020-06-21 NOTE — DISCHARGE INSTRUCTIONS
Follow-up with cardiology in the next 48 to 72 hours. Contact office to schedule close follow-up ointment. Take Protonix as directed. Please return to emergency department if symptoms worsen or progress in any way. Chest Pain: Care Instructions  Your Care Instructions     There are many things that can cause chest pain. Some are not serious and will get better on their own in a few days. But some kinds of chest pain need more testing and treatment. Your doctor may have recommended a follow-up visit in the next 8 to 12 hours. If you are not getting better, you may need more tests or treatment. Even though your doctor has released you, you still need to watch for any problems. The doctor carefully checked you, but sometimes problems can develop later. If you have new symptoms or if your symptoms do not get better, get medical care right away. If you have worse or different chest pain or pressure that lasts more than 5 minutes or you passed out (lost consciousness), [id number] or seek other emergency help right away. A medical visit is only one step in your treatment. Even if you feel better, you still need to do what your doctor recommends, such as going to all suggested follow-up appointments and taking medicines exactly as directed. This will help you recover and help prevent future problems. How can you care for yourself at home? · Rest until you feel better. · Take your medicine exactly as prescribed. Call your doctor if you think you are having a problem with your medicine. · Do not drive after taking a prescription pain medicine. When should you call for help? [id number]:   · You passed out (lost consciousness). · You have severe difficulty breathing. · You have symptoms of a heart attack. These may include:  ? Chest pain or pressure, or a strange feeling in your chest.  ? Sweating. ? Shortness of breath. ? Nausea or vomiting.   ? Pain, pressure, or a strange feeling in your back, neck, jaw, or upper belly or in one or both shoulders or arms. ? Lightheadedness or sudden weakness. ? A fast or irregular heartbeat. After you call 911, the  may tell you to chew 1 adult-strength or 2 to 4 low-dose aspirin. Wait for an ambulance. Do not try to drive yourself. Call your doctor today if:   · You have any trouble breathing. · Your chest pain gets worse. · You are dizzy or lightheaded, or you feel like you may faint. · You are not getting better as expected. · You are having new or different chest pain. Where can you learn more? Go to http://cassandra-anna.info/  Enter A120 in the search box to learn more about \"Chest Pain: Care Instructions. \"  Current as of: June 26, 2019               Content Version: 12.5  © 8362-4069 [employer]. Care instructions adapted under license by [employer] (which disclaims liability or warranty for this information). If you have questions about a medical condition or this instruction, always ask your healthcare professional. [place] [address] any warranty or liability for your use of this information. Gastroesophageal Reflux Disease (GERD): Care Instructions  Your Care Instructions     Gastroesophageal reflux disease (GERD) is the backward flow of stomach acid into the esophagus. The esophagus is the tube that leads from your throat to your stomach. A one-way valve prevents the stomach acid from backing up into this tube. When you have GERD, this valve does not close tightly enough. This can also cause pain and swelling in your esophagus (esophagitis). If you have mild GERD symptoms including heartburn, you may be able to control the problem with antacids or over-the-counter medicine. Changing your diet and eating habits, such as not eating late at night, losing weight, and making other lifestyle changes can also help reduce symptoms. Follow-up care is a key part of your treatment and safety.  Be sure to make and go to all appointments, and call your doctor if you are having problems. It's also a good idea to know your test results and keep a list of the medicines you take. How can you care for yourself at home? · Take your medicines exactly as prescribed. Call your doctor if you think you are having a problem with your medicine. · Your doctor may recommend over-the-counter medicine. For mild or occasional indigestion, antacids, such as Tums, Gaviscon, Mylanta, or Maalox, may help. Your doctor also may recommend over-the-counter acid reducers, such as Pepcid AC (famotidine), Tagamet HB (cimetidine), or Prilosec (omeprazole). Read and follow all instructions on the label. If you use these medicines often, talk with your doctor. · Change your eating habits. ? It's best to eat several small meals instead of two or three large meals. ? After you eat, wait 2 to 3 hours before you lie down. ? Chocolate, mint, and alcohol can make GERD worse. ? Spicy foods, foods that have a lot of acid (like tomatoes and oranges), and coffee can make GERD symptoms worse in some people. If your symptoms are worse after you eat a certain food, you may want to stop eating that food to see if your symptoms get better. · Do not smoke or chew tobacco. Smoking can make GERD worse. If you need help quitting, talk to your doctor about stop-smoking programs and medicines. These can increase your chances of quitting for good. · If you have GERD symptoms at night, raise the head of your bed 6 to 8 inches by putting the frame on blocks or placing a foam wedge under the head of your mattress. (Adding extra pillows does not work.)  · Do not wear tight clothing around your middle. · Lose weight if you need to. Losing just 5 to 10 pounds can help. When should you call for help? Call your doctor now or seek immediate medical care if:  · You have new or different belly pain.   · Your stools are black and tarlike or have streaks of blood. Watch closely for changes in your health, and be sure to contact your doctor if:  · Your symptoms have not improved after 2 days. · Food seems to catch in your throat or chest.  Where can you learn more? Go to http://cassandra-anna.info/  Enter C312 in the search box to learn more about \"Gastroesophageal Reflux Disease (GERD): Care Instructions. \"  Current as of: August 12, 2019               Content Version: 12.5  © 1681-9309 [employer]. Care instructions adapted under license by [employer] (which disclaims liability or warranty for this information).  If you have questions about a medical condition or this instruction, always ask your healthcare professional. [address] any warranty or liability for your use of this information.

## 2020-06-23 ENCOUNTER — PATIENT OUTREACH (OUTPATIENT)
Dept: CASE MANAGEMENT | Age: 45
End: 2020-06-23

## 2020-06-23 NOTE — PROGRESS NOTES
This note will not be viewable in [address] 2nd attempt to contact patient for Concern for Covid-19 (Coronavirus) Education provided due to risk condition call, no answer, left message on voicemail to please return my call. Will attempt 3rd call within 5 business days.

## 2021-07-27 ENCOUNTER — APPOINTMENT (OUTPATIENT)
Dept: CT IMAGING | Age: 46
End: 2021-07-27
Attending: STUDENT IN AN ORGANIZED HEALTH CARE EDUCATION/TRAINING PROGRAM
Payer: COMMERCIAL

## 2021-07-27 ENCOUNTER — HOSPITAL ENCOUNTER (EMERGENCY)
Age: 46
Discharge: HOME | End: 2021-07-27
Attending: STUDENT IN AN ORGANIZED HEALTH CARE EDUCATION/TRAINING PROGRAM
Payer: COMMERCIAL

## 2021-07-27 VITALS
BODY MASS INDEX: 32.12 KG/M2 | TEMPERATURE: 98.2 F | OXYGEN SATURATION: 98 % | SYSTOLIC BLOOD PRESSURE: 108 MMHG | HEIGHT: 58 IN | RESPIRATION RATE: 18 BRPM | DIASTOLIC BLOOD PRESSURE: 72 MMHG | WEIGHT: 153 LBS | HEART RATE: 82 BPM

## 2021-07-27 DIAGNOSIS — R10.13 ABDOMINAL PAIN, EPIGASTRIC: Primary | ICD-10-CM

## 2021-07-27 DIAGNOSIS — N83.201 CYST OF RIGHT OVARY: ICD-10-CM

## 2021-07-27 LAB
ALBUMIN SERPL-MCNC: 3.5 G/DL (ref 3.5–5)
ALBUMIN/GLOB SERPL: 0.9 {RATIO} (ref 1.2–3.5)
ALP SERPL-CCNC: 64 U/L (ref 50–136)
ALT SERPL-CCNC: 28 U/L (ref 12–65)
ANION GAP SERPL CALC-SCNC: 6 MMOL/L (ref 7–16)
AST SERPL-CCNC: 22 U/L (ref 15–37)
BASOPHILS # BLD: 0.1 K/UL (ref 0–0.2)
BASOPHILS NFR BLD: 1 % (ref 0–2)
BILIRUB SERPL-MCNC: 0.3 MG/DL (ref 0.2–1.1)
BUN SERPL-MCNC: 13 MG/DL (ref 6–23)
CALCIUM SERPL-MCNC: 8.2 MG/DL (ref 8.3–10.4)
CHLORIDE SERPL-SCNC: 107 MMOL/L (ref 98–107)
CO2 SERPL-SCNC: 25 MMOL/L (ref 21–32)
CREAT SERPL-MCNC: 0.79 MG/DL (ref 0.6–1)
DIFFERENTIAL METHOD BLD: NORMAL
EOSINOPHIL # BLD: 0.1 K/UL (ref 0–0.8)
EOSINOPHIL NFR BLD: 1 % (ref 0.5–7.8)
ERYTHROCYTE [DISTWIDTH] IN BLOOD BY AUTOMATED COUNT: 12.3 % (ref 11.9–14.6)
GLOBULIN SER CALC-MCNC: 3.8 G/DL (ref 2.3–3.5)
GLUCOSE SERPL-MCNC: 151 MG/DL (ref 65–100)
HCT VFR BLD AUTO: 41 % (ref 35.8–46.3)
HGB BLD-MCNC: 13.5 G/DL (ref 11.7–15.4)
IMM GRANULOCYTES # BLD AUTO: 0 K/UL (ref 0–0.5)
IMM GRANULOCYTES NFR BLD AUTO: 0 % (ref 0–5)
LIPASE SERPL-CCNC: 113 U/L (ref 73–393)
LYMPHOCYTES # BLD: 2.9 K/UL (ref 0.5–4.6)
LYMPHOCYTES NFR BLD: 30 % (ref 13–44)
MCH RBC QN AUTO: 29.5 PG (ref 26.1–32.9)
MCHC RBC AUTO-ENTMCNC: 32.9 G/DL (ref 31.4–35)
MCV RBC AUTO: 89.7 FL (ref 79.6–97.8)
MONOCYTES # BLD: 0.6 K/UL (ref 0.1–1.3)
MONOCYTES NFR BLD: 6 % (ref 4–12)
NEUTS SEG # BLD: 6 K/UL (ref 1.7–8.2)
NEUTS SEG NFR BLD: 62 % (ref 43–78)
NRBC # BLD: 0 K/UL (ref 0–0.2)
PLATELET # BLD AUTO: 353 K/UL (ref 150–450)
PMV BLD AUTO: 10.2 FL (ref 9.4–12.3)
POTASSIUM SERPL-SCNC: 3.9 MMOL/L (ref 3.5–5.1)
PROT SERPL-MCNC: 7.3 G/DL (ref 6.3–8.2)
RBC # BLD AUTO: 4.57 M/UL (ref 4.05–5.2)
SODIUM SERPL-SCNC: 138 MMOL/L (ref 136–145)
WBC # BLD AUTO: 9.8 K/UL (ref 4.3–11.1)

## 2021-07-27 PROCEDURE — 74011000258 HC RX REV CODE- 258: Performed by: STUDENT IN AN ORGANIZED HEALTH CARE EDUCATION/TRAINING PROGRAM

## 2021-07-27 PROCEDURE — 81003 URINALYSIS AUTO W/O SCOPE: CPT

## 2021-07-27 PROCEDURE — 83690 ASSAY OF LIPASE: CPT

## 2021-07-27 PROCEDURE — 96361 HYDRATE IV INFUSION ADD-ON: CPT

## 2021-07-27 PROCEDURE — 99284 EMERGENCY DEPT VISIT MOD MDM: CPT

## 2021-07-27 PROCEDURE — 85025 COMPLETE CBC W/AUTO DIFF WBC: CPT

## 2021-07-27 PROCEDURE — 96374 THER/PROPH/DIAG INJ IV PUSH: CPT

## 2021-07-27 PROCEDURE — 74177 CT ABD & PELVIS W/CONTRAST: CPT

## 2021-07-27 PROCEDURE — 74011000636 HC RX REV CODE- 636: Performed by: STUDENT IN AN ORGANIZED HEALTH CARE EDUCATION/TRAINING PROGRAM

## 2021-07-27 PROCEDURE — 80053 COMPREHEN METABOLIC PANEL: CPT

## 2021-07-27 PROCEDURE — 74011250636 HC RX REV CODE- 250/636: Performed by: STUDENT IN AN ORGANIZED HEALTH CARE EDUCATION/TRAINING PROGRAM

## 2021-07-27 RX ORDER — HYOSCYAMINE SULFATE 0.125 MG
125 TABLET ORAL
Qty: 20 TABLET | Refills: 0 | Status: SHIPPED | OUTPATIENT
Start: 2021-07-27

## 2021-07-27 RX ORDER — SODIUM CHLORIDE 0.9 % (FLUSH) 0.9 %
10 SYRINGE (ML) INJECTION
Status: COMPLETED | OUTPATIENT
Start: 2021-07-27 | End: 2021-07-27

## 2021-07-27 RX ORDER — SODIUM CHLORIDE 0.9 % (FLUSH) 0.9 %
5-10 SYRINGE (ML) INJECTION EVERY 8 HOURS
Status: DISCONTINUED | OUTPATIENT
Start: 2021-07-27 | End: 2021-07-28 | Stop reason: HOSPADM

## 2021-07-27 RX ORDER — SODIUM CHLORIDE 0.9 % (FLUSH) 0.9 %
5-10 SYRINGE (ML) INJECTION AS NEEDED
Status: DISCONTINUED | OUTPATIENT
Start: 2021-07-27 | End: 2021-07-28 | Stop reason: HOSPADM

## 2021-07-27 RX ORDER — METOCLOPRAMIDE HYDROCHLORIDE 5 MG/ML
10 INJECTION INTRAMUSCULAR; INTRAVENOUS
Status: COMPLETED | OUTPATIENT
Start: 2021-07-27 | End: 2021-07-27

## 2021-07-27 RX ORDER — SODIUM CHLORIDE 9 MG/ML
1000 INJECTION, SOLUTION INTRAVENOUS ONCE
Status: COMPLETED | OUTPATIENT
Start: 2021-07-27 | End: 2021-07-27

## 2021-07-27 RX ADMIN — SODIUM CHLORIDE 1000 ML: 9 INJECTION, SOLUTION INTRAVENOUS at 20:05

## 2021-07-27 RX ADMIN — Medication 10 ML: at 19:47

## 2021-07-27 RX ADMIN — IOPAMIDOL 100 ML: 755 INJECTION, SOLUTION INTRAVENOUS at 19:47

## 2021-07-27 RX ADMIN — METOCLOPRAMIDE HYDROCHLORIDE 10 MG: 5 INJECTION INTRAMUSCULAR; INTRAVENOUS at 20:03

## 2021-07-27 RX ADMIN — SODIUM CHLORIDE 100 ML: 900 INJECTION, SOLUTION INTRAVENOUS at 19:48

## 2021-07-27 NOTE — ED TRIAGE NOTES
Patient arrives ambulatory to triage with mask in place. Patient reports bilateral foot/hand swelling for 2 weeks. Patient reports abdominal bloating. Went to pcp today for eval.  Reports hx diabetes and reports concern for ketones in urine at office today.

## 2021-07-27 NOTE — ED PROVIDER NOTES
65-year-old female presents to the ER complaining of right upper quadrant was epigastric abdominal discomfort. States she noticed it approximately 1 week ago. Reports nausea but no vomiting. Does have diabetes. Was seen at her family [de-identified] office earlier today noted to have glucose and ketones in her urine. Patient has history of cholecystectomy. Was told by her primary care physician that it symptoms were secondary to indigestion and patient should take over-the-counter Maalox for symptoms. She again reports the discomfort has been present for 1 week, no significant improving or worsening factors. Mild nausea no vomiting. Denies fever           Past Medical History:   Diagnosis Date    Hypertension        Past Surgical History:   Procedure Laterality Date    HX GYN      c-sec x2; hysterectomy         History reviewed. No pertinent family history. Social History     Socioeconomic History    Marital status:      Spouse name: Not on file    Number of children: Not on file    Years of education: Not on file    Highest education level: Not on file   Occupational History    Not on file   Tobacco Use    Smoking status: Never Smoker    Smokeless tobacco: Never Used   Substance and Sexual Activity    Alcohol use: No    Drug use: No    Sexual activity: Yes     Birth control/protection: Surgical   Other Topics Concern    Not on file   Social History Narrative    Not on file     Social Determinants of Health     Financial Resource Strain:     Difficulty of Paying Living Expenses:    Food Insecurity:     Worried About Running Out of Food in the Last Year:     [address] in the Last Year:    Transportation Needs:     Lack of Transportation (Medical):      Lack of Transportation (Non-Medical):    Physical Activity:     Days of Exercise per Week:     Minutes of Exercise per Session:    Stress:     Feeling of Stress :    Social Connections:     Frequency of Communication with Friends and Family:     Frequency of Social Gatherings with Friends and Family:     Attends Yarsani Services:     Active Member of Clubs or Organizations:     Attends Club or Organization Meetings:     Marital Status:    Intimate Partner Violence:     Fear of Current or Ex-Partner:     Emotionally Abused:     Physically Abused:     Sexually Abused: ALLERGIES: Aspirin    Review of Systems   Constitutional: Negative for chills and fever. HENT: Negative for sinus pressure and sore throat. Eyes: Negative for visual disturbance. Respiratory: Negative for cough and shortness of breath. Cardiovascular: Negative for chest pain. Gastrointestinal: Positive for abdominal pain and nausea. Negative for diarrhea and vomiting. Endocrine: Negative for polyuria. Genitourinary: Negative for difficulty urinating and dysuria. Musculoskeletal: Negative for neck pain and neck stiffness. Skin: Negative for rash. Neurological: Negative for weakness and headaches. Psychiatric/Behavioral: Negative for confusion. All other systems reviewed and are negative. Vitals:    07/27/21 1804   BP: (!) 161/108   Pulse: 94   Resp: 18   Temp: 97.7 °F (36.5 °C)   SpO2: 99%   Weight: 69.4 kg (153 lb)   Height: 4' 10\" (1.473 m)            Physical Exam  Vitals and nursing note reviewed. Constitutional:       Appearance: Normal appearance. She is not ill-appearing or toxic-appearing. HENT:      Head: Normocephalic and atraumatic. Nose: Nose normal.      Mouth/Throat:      Mouth: Mucous membranes are moist.   Eyes:      Extraocular Movements: Extraocular movements intact. Cardiovascular:      Rate and Rhythm: Normal rate and regular rhythm. Pulses: Normal pulses. Heart sounds: Normal heart sounds. Pulmonary:      Effort: Pulmonary effort is normal. No respiratory distress. Breath sounds: Normal breath sounds. Abdominal:      General: Abdomen is flat. There is no distension. Palpations: Abdomen is soft. Tenderness: There is abdominal tenderness in the right upper quadrant and epigastric area. There is no guarding or rebound. Musculoskeletal:         General: Normal range of motion. Cervical back: Normal range of motion. No rigidity. Skin:     General: Skin is warm and dry. Neurological:      General: No focal deficit present. Mental Status: She is alert and oriented to person, place, and time. Psychiatric:         Mood and Affect: Mood normal.          MDM  Number of Diagnoses or Management Options  Diagnosis management comments: 44-year-old female presents to the ER complaint epigastric upper quad abdominal pain and nausea. Given 1 L bolus IV fluid also given 10 mg IV Reglan. Labs are normal white count, stable H&H, Normal electrolytes and normal kidney function, normal lipase. CT scan patient's abdomen pelvis shows a right ovarian cyst without any other emergent findings. Patient's pain is not in her right lower quadrant or suprapubic region, her pains in the right upper and epigastric. Patient with improved symptoms since being here in the ER, nausea resolved after Reglan. Patient without any emergent findings, will discharge home to follow-up with primary care physician. Will give prescription for Levsin to take as needed for abdominal cramping. Patient given strict return precautions. She voiced understanding and agreement with this plan.        Amount and/or Complexity of Data Reviewed  Clinical lab tests: ordered and reviewed  Tests in the radiology section of CPT®: ordered and reviewed    Risk of Complications, Morbidity, and/or Mortality  Presenting problems: moderate  Diagnostic procedures: moderate  Management options: moderate           Procedures

## 2021-07-28 NOTE — ED NOTES
I have reviewed discharge instructions with the patient. The patient verbalized understanding. Patient left ED via Discharge Method: ambulatory to Home with self. Opportunity for questions and clarification provided. Patient given 1 scripts. To continue your aftercare when you leave the hospital, you may receive an automated call from our care team to check in on how you are doing. This is a free service and part of our promise to provide the best care and service to meet your aftercare needs.  If you have questions, or wish to unsubscribe from this service please call [phone]. Thank you for Choosing our [facility] Emergency Department.

## 2021-07-28 NOTE — DISCHARGE INSTRUCTIONS
Take medication as prescribed as needed for pain. Follow-up with family physician within 1 week. Return to the ER for worsening or worrisome symptoms.

## 2022-03-19 PROBLEM — J02.0 STREP THROAT: Status: ACTIVE | Noted: 2017-10-28

## 2022-03-19 PROBLEM — E11.9 DM TYPE 2 (DIABETES MELLITUS, TYPE 2) (HCC): Status: ACTIVE | Noted: 2017-10-27

## 2022-03-20 PROBLEM — I10 ESSENTIAL HYPERTENSION: Status: ACTIVE | Noted: 2017-10-27

## 2022-05-25 ENCOUNTER — HOSPITAL ENCOUNTER (EMERGENCY)
Age: 47
Discharge: HOME | End: 2022-05-25
Attending: EMERGENCY MEDICINE | Admitting: EMERGENCY MEDICINE

## 2022-05-25 VITALS
HEART RATE: 90 BPM | OXYGEN SATURATION: 95 % | WEIGHT: 152 LBS | RESPIRATION RATE: 16 BRPM | TEMPERATURE: 98.6 F | DIASTOLIC BLOOD PRESSURE: 99 MMHG | HEIGHT: 58 IN | SYSTOLIC BLOOD PRESSURE: 129 MMHG | BODY MASS INDEX: 31.91 KG/M2

## 2022-05-25 LAB
ALBUMIN SERPL-MCNC: 3.1 G/DL (ref 3.5–5)
ALBUMIN/GLOB SERPL: 0.8 {RATIO} (ref 1.2–3.5)
ALP SERPL-CCNC: 78 U/L (ref 50–136)
ALT SERPL-CCNC: 32 U/L (ref 12–65)
ANION GAP SERPL CALC-SCNC: 9 MMOL/L (ref 7–16)
AST SERPL-CCNC: 21 U/L (ref 15–37)
BASOPHILS # BLD: 0.1 K/UL (ref 0–0.2)
BASOPHILS NFR BLD: 1 % (ref 0–2)
BILIRUB SERPL-MCNC: 0.3 MG/DL (ref 0.2–1.1)
BILIRUB UR QL: NEGATIVE
BUN SERPL-MCNC: 12 MG/DL (ref 6–23)
CALCIUM SERPL-MCNC: 8.7 MG/DL (ref 8.3–10.4)
CHLORIDE SERPL-SCNC: 104 MMOL/L (ref 98–107)
CO2 SERPL-SCNC: 24 MMOL/L (ref 21–32)
CREAT SERPL-MCNC: 0.8 MG/DL (ref 0.6–1)
DIFFERENTIAL METHOD BLD: NORMAL
EOSINOPHIL # BLD: 0.1 K/UL (ref 0–0.8)
EOSINOPHIL NFR BLD: 2 % (ref 0.5–7.8)
ERYTHROCYTE [DISTWIDTH] IN BLOOD BY AUTOMATED COUNT: 12.1 % (ref 11.9–14.6)
GLOBULIN SER CALC-MCNC: 4 G/DL (ref 2.3–3.5)
GLUCOSE BLD STRIP.AUTO-MCNC: 177 MG/DL (ref 65–100)
GLUCOSE BLD STRIP.AUTO-MCNC: 256 MG/DL (ref 65–100)
GLUCOSE SERPL-MCNC: 266 MG/DL (ref 65–100)
GLUCOSE UR QL STRIP.AUTO: 500 MG/DL
HCT VFR BLD AUTO: 40.7 % (ref 35.8–46.3)
HGB BLD-MCNC: 13.6 G/DL (ref 11.7–15.4)
IMM GRANULOCYTES # BLD AUTO: 0 K/UL (ref 0–0.5)
IMM GRANULOCYTES NFR BLD AUTO: 1 % (ref 0–5)
KETONES UR-MCNC: 15 MG/DL
LEUKOCYTE ESTERASE UR QL STRIP: NEGATIVE
LYMPHOCYTES # BLD: 2.8 K/UL (ref 0.5–4.6)
LYMPHOCYTES NFR BLD: 36 % (ref 13–44)
MCH RBC QN AUTO: 28.8 PG (ref 26.1–32.9)
MCHC RBC AUTO-ENTMCNC: 33.4 G/DL (ref 31.4–35)
MCV RBC AUTO: 86 FL (ref 79.6–97.8)
MONOCYTES # BLD: 0.5 K/UL (ref 0.1–1.3)
MONOCYTES NFR BLD: 7 % (ref 4–12)
NEUTS SEG # BLD: 4.1 K/UL (ref 1.7–8.2)
NEUTS SEG NFR BLD: 53 % (ref 43–78)
NITRITE UR QL: NEGATIVE
NRBC # BLD: 0 K/UL (ref 0–0.2)
PH UR: 5 [PH] (ref 5–9)
PLATELET # BLD AUTO: 311 K/UL (ref 150–450)
PMV BLD AUTO: 10.5 FL (ref 9.4–12.3)
POTASSIUM SERPL-SCNC: 4.2 MMOL/L (ref 3.5–5.1)
PROT SERPL-MCNC: 7.1 G/DL (ref 6.3–8.2)
PROT UR QL: NEGATIVE MG/DL
RBC # BLD AUTO: 4.73 M/UL (ref 4.05–5.2)
RBC # UR STRIP: NEGATIVE /UL
SERVICE CMNT-IMP: ABNORMAL
SODIUM SERPL-SCNC: 137 MMOL/L (ref 136–145)
SP GR UR: 1.02 (ref 1–1.02)
TSH W FREE THYROID IF ABNORMAL: 3.07 UIU/ML (ref 0.36–3.74)
UROBILINOGEN UR QL: 0.2 EU/DL (ref 0.2–1)
WBC # BLD AUTO: 7.6 K/UL (ref 4.3–11.1)

## 2022-05-25 PROCEDURE — 82962 GLUCOSE BLOOD TEST: CPT

## 2022-05-25 PROCEDURE — 80053 COMPREHEN METABOLIC PANEL: CPT

## 2022-05-25 PROCEDURE — 81003 URINALYSIS AUTO W/O SCOPE: CPT

## 2022-05-25 PROCEDURE — 6360000002 HC RX W HCPCS: Performed by: EMERGENCY MEDICINE

## 2022-05-25 PROCEDURE — 96374 THER/PROPH/DIAG INJ IV PUSH: CPT

## 2022-05-25 PROCEDURE — 99284 EMERGENCY DEPT VISIT MOD MDM: CPT

## 2022-05-25 PROCEDURE — 85025 COMPLETE CBC W/AUTO DIFF WBC: CPT

## 2022-05-25 PROCEDURE — 2580000003 HC RX 258: Performed by: EMERGENCY MEDICINE

## 2022-05-25 PROCEDURE — 84443 ASSAY THYROID STIM HORMONE: CPT

## 2022-05-25 RX ORDER — ONDANSETRON 2 MG/ML
4 INJECTION INTRAMUSCULAR; INTRAVENOUS
Status: COMPLETED | OUTPATIENT
Start: 2022-05-25 | End: 2022-05-25

## 2022-05-25 RX ORDER — SODIUM CHLORIDE 0.9 % (FLUSH) 0.9 %
3 SYRINGE (ML) INJECTION EVERY 8 HOURS
Status: DISCONTINUED | OUTPATIENT
Start: 2022-05-25 | End: 2022-05-25 | Stop reason: HOSPADM

## 2022-05-25 RX ORDER — 0.9 % SODIUM CHLORIDE 0.9 %
1000 INTRAVENOUS SOLUTION INTRAVENOUS ONCE
Status: COMPLETED | OUTPATIENT
Start: 2022-05-25 | End: 2022-05-25

## 2022-05-25 RX ADMIN — SODIUM CHLORIDE 1000 ML: 9 INJECTION, SOLUTION INTRAVENOUS at 12:18

## 2022-05-25 RX ADMIN — ONDANSETRON 4 MG: 2 INJECTION INTRAMUSCULAR; INTRAVENOUS at 12:20

## 2022-05-25 RX ADMIN — SODIUM CHLORIDE, PRESERVATIVE FREE 3 ML: 5 INJECTION INTRAVENOUS at 12:22

## 2022-05-25 ASSESSMENT — ENCOUNTER SYMPTOMS
NAUSEA: 1
ABDOMINAL PAIN: 0
VOMITING: 0
SHORTNESS OF BREATH: 0

## 2022-05-25 ASSESSMENT — PAIN SCALES - GENERAL: PAINLEVEL_OUTOF10: 0

## 2022-05-25 ASSESSMENT — PAIN - FUNCTIONAL ASSESSMENT: PAIN_FUNCTIONAL_ASSESSMENT: 0-10

## 2022-05-25 NOTE — ED PROVIDER NOTES
[facility] Emergency Department Provider Note                   PCP:                No primary care provider on file. Age: 42 y.o. Sex: female     No diagnosis found. DISPOSITION         New Prescriptions    No medications on file       Orders Placed This Encounter   Procedures    CBC with Auto Differential    CMP    TSH with Reflex    POCT Urine Dipstick    POCT Glucose    POCT Glucose    Saline lock IV        MDM  Number of Diagnoses or Management Options     Amount and/or Complexity of Data Reviewed  Clinical lab tests: ordered and reviewed  Independent visualization of images, tracings, or specimens: yes    Risk of Complications, Morbidity, and/or Mortality  Presenting problems: moderate  Diagnostic procedures: moderate  Management options: moderate    Patient Progress  Patient progress: stable       Dulce Maria Brown is a 42 y.o. female who presents to the Emergency Department with chief complaint of    Chief Complaint   Patient presents with    Hyperglycemia      Patient presents emerged department with complaints of elevated blood sugar. She also states that she has not been feeling well for the past 3 days. Blood sugar which is normally under good control with Glucophage 1000 mg twice daily was over 300 yesterday morning and again this morning. She denies any alterations or noncompliance with diabetic diet. She denies any fever or chills. She does report urinary frequency but no dysuria. She denies vomiting or diarrhea but does report nausea. She denies any cough or congestion or shortness of breath. The history is provided by the patient and medical records.    Hyperglycemia  Blood sugar level PTA:  >300  Severity:  Moderate  Onset quality:  Sudden  Duration:  1 day  Timing:  Constant  Chronicity:  New  Diabetes status:  Controlled with oral medications  Time since last antidiabetic medication:  4 hours  Context: not change in medication, not noncompliance, not recent change in diet and not recent illness    Relieved by:  Nothing  Ineffective treatments:  Oral agents  Associated symptoms: nausea and polyuria    Associated symptoms: no abdominal pain, no chest pain, no confusion, no dehydration, no diaphoresis, no dizziness, no dysuria, no fatigue, no fever, no increased appetite, no increased thirst, no malaise, no shortness of breath, no syncope and no vomiting        All other systems reviewed and are negative. Review of Systems   Constitutional: Negative for diaphoresis, fatigue and fever. Respiratory: Negative for shortness of breath. Cardiovascular: Negative for chest pain and syncope. Gastrointestinal: Positive for nausea. Negative for abdominal pain and vomiting. Endocrine: Positive for polyuria. Negative for polydipsia. Genitourinary: Negative for dysuria. Neurological: Negative for dizziness. Psychiatric/Behavioral: Negative for confusion. All other systems reviewed and are negative. Past Medical History:   Diagnosis Date    Hypertension         Past Surgical History:   Procedure Laterality Date    GYN      c-sec x2; hysterectomy        No family history on file. Social Connections:     Frequency of Communication with Friends and Family: Not on file    Frequency of Social Gatherings with Friends and Family: Not on file    Attends Muslim Services: Not on file    Active Member of Clubs or Organizations: Not on file    Attends Club or Organization Meetings: Not on file    Marital Status: Not on file        Allergies   Allergen Reactions    Aspirin Other (See Comments)     Unknown reaction        Vitals signs and nursing note reviewed. Patient Vitals for the past 4 hrs:   Temp Pulse Resp BP SpO2   05/25/22 1037 98.6 °F (37 °C) 90 16 (!) 141/98 97 %          Physical Exam  Vitals and nursing note reviewed. Constitutional:       General: She is not in acute distress. Appearance: Normal appearance.  She is not ill-appearing, toxic-appearing or diaphoretic. HENT:      Head: Normocephalic and atraumatic. Mouth/Throat:      Mouth: Mucous membranes are moist.      Pharynx: Oropharynx is clear. No oropharyngeal exudate or posterior oropharyngeal erythema. Eyes:      Extraocular Movements: Extraocular movements intact. Pupils: Pupils are equal, round, and reactive to light. Cardiovascular:      Rate and Rhythm: Normal rate and regular rhythm. Pulses: Normal pulses. Heart sounds: Normal heart sounds. Pulmonary:      Effort: Pulmonary effort is normal.      Breath sounds: Normal breath sounds. Abdominal:      General: There is no distension. Palpations: Abdomen is soft. Tenderness: There is no abdominal tenderness. There is no right CVA tenderness, left CVA tenderness or guarding. Musculoskeletal:         General: Normal range of motion. Cervical back: Normal range of motion and neck supple. No rigidity or tenderness. Lymphadenopathy:      Cervical: No cervical adenopathy. Skin:     General: Skin is warm and dry. Neurological:      General: No focal deficit present. Mental Status: She is alert and oriented to person, place, and time. Mental status is at baseline. Psychiatric:         Mood and Affect: Mood normal.         Behavior: Behavior normal.         Thought Content: Thought content normal.          Procedures    Labs Reviewed   POCT GLUCOSE - Abnormal; Notable for the following components:       Result Value    POC Glucose 256 (*)     All other components within normal limits   CBC WITH AUTO DIFFERENTIAL   COMPREHENSIVE METABOLIC PANEL   POCT GLUCOSE        No orders to display            [place] Coma Scale  Eye Opening: Spontaneous  Best Verbal Response: Oriented  Best Motor Response: Obeys commands  Daren Coma Scale Score: 15                     Voice dictation software was used during the making of this note.   This software is not perfect and grammatical and other typographical errors may be present. This note has not been completely proofread for errors.         Ariana Hunter,   05/25/22 4895

## 2022-05-25 NOTE — ED TRIAGE NOTES
Patient arrives to ED pov from home. Patient reports high blood sugar for a few days. Patient reports she has been feeling nauseas with frequent urination. Patient reports she takes Metformin. Patient reports her blood sugar has been in the 300's at home.

## 2022-05-25 NOTE — ED NOTES
I have reviewed discharge instructions with the patient. The patient verbalized understanding. Patient left ED via Discharge Method: ambulatory to Home with self. Opportunity for questions and clarification provided. Patient given 0 scripts. To continue your aftercare when you leave the hospital, you may receive an automated call from our care team to check in on how you are doing. This is a free service and part of our promise to provide the best care and service to meet your aftercare needs.  If you have questions, or wish to unsubscribe from this service please call [phone]. Thank you for Choosing our [facility] Emergency Department.       Hans Urbina RN  05/25/22 8116

## 2023-08-24 ENCOUNTER — HOSPITAL ENCOUNTER (EMERGENCY)
Age: 48
Discharge: HOME | End: 2023-08-24
Attending: EMERGENCY MEDICINE
Payer: COMMERCIAL

## 2023-08-24 VITALS
WEIGHT: 157 LBS | OXYGEN SATURATION: 97 % | HEART RATE: 90 BPM | DIASTOLIC BLOOD PRESSURE: 94 MMHG | BODY MASS INDEX: 32.95 KG/M2 | TEMPERATURE: 97.6 F | HEIGHT: 58 IN | SYSTOLIC BLOOD PRESSURE: 139 MMHG | RESPIRATION RATE: 16 BRPM

## 2023-08-24 DIAGNOSIS — J06.9 ACUTE UPPER RESPIRATORY INFECTION: ICD-10-CM

## 2023-08-24 DIAGNOSIS — E86.0 DEHYDRATION: Primary | ICD-10-CM

## 2023-08-24 LAB
ALBUMIN SERPL-MCNC: 3.6 G/DL (ref 3.5–5)
ALBUMIN/GLOB SERPL: 0.9 (ref 0.4–1.6)
ALP SERPL-CCNC: 83 U/L (ref 50–136)
ALT SERPL-CCNC: 31 U/L (ref 12–65)
ANION GAP SERPL CALC-SCNC: 6 MMOL/L (ref 2–11)
APPEARANCE UR: ABNORMAL
AST SERPL-CCNC: 36 U/L (ref 15–37)
B PERT DNA SPEC QL NAA+PROBE: NOT DETECTED
BACTERIA URNS QL MICRO: ABNORMAL /HPF
BASOPHILS # BLD: 0.1 K/UL (ref 0–0.2)
BASOPHILS NFR BLD: 1 % (ref 0–2)
BILIRUB SERPL-MCNC: 0.5 MG/DL (ref 0.2–1.1)
BILIRUB UR QL: NEGATIVE
BORDETELLA PARAPERTUSSIS BY PCR: NOT DETECTED
BUN SERPL-MCNC: 12 MG/DL (ref 6–23)
C PNEUM DNA SPEC QL NAA+PROBE: NOT DETECTED
CALCIUM SERPL-MCNC: 8.6 MG/DL (ref 8.3–10.4)
CASTS URNS QL MICRO: 0 /LPF
CHLORIDE SERPL-SCNC: 106 MMOL/L (ref 101–110)
CO2 SERPL-SCNC: 26 MMOL/L (ref 21–32)
COLOR UR: ABNORMAL
CREAT SERPL-MCNC: 1 MG/DL (ref 0.6–1)
CRYSTALS URNS QL MICRO: 0 /LPF
DIFFERENTIAL METHOD BLD: NORMAL
EOSINOPHIL # BLD: 0.2 K/UL (ref 0–0.8)
EOSINOPHIL NFR BLD: 2 % (ref 0.5–7.8)
EPI CELLS #/AREA URNS HPF: ABNORMAL /HPF
ERYTHROCYTE [DISTWIDTH] IN BLOOD BY AUTOMATED COUNT: 12.8 % (ref 11.9–14.6)
FLUAV SUBTYP SPEC NAA+PROBE: NOT DETECTED
FLUBV RNA SPEC QL NAA+PROBE: NOT DETECTED
GLOBULIN SER CALC-MCNC: 4 G/DL (ref 2.8–4.5)
GLUCOSE SERPL-MCNC: 294 MG/DL (ref 65–100)
GLUCOSE UR STRIP.AUTO-MCNC: >1000 MG/DL
HADV DNA SPEC QL NAA+PROBE: NOT DETECTED
HCG UR QL: NEGATIVE
HCG, URINE, POC: NEGATIVE
HCOV 229E RNA SPEC QL NAA+PROBE: NOT DETECTED
HCOV HKU1 RNA SPEC QL NAA+PROBE: NOT DETECTED
HCOV NL63 RNA SPEC QL NAA+PROBE: NOT DETECTED
HCOV OC43 RNA SPEC QL NAA+PROBE: NOT DETECTED
HCT VFR BLD AUTO: 44.4 % (ref 35.8–46.3)
HGB BLD-MCNC: 15.1 G/DL (ref 11.7–15.4)
HGB UR QL STRIP: NEGATIVE
HMPV RNA SPEC QL NAA+PROBE: NOT DETECTED
HPIV1 RNA SPEC QL NAA+PROBE: NOT DETECTED
HPIV2 RNA SPEC QL NAA+PROBE: NOT DETECTED
HPIV3 RNA SPEC QL NAA+PROBE: NOT DETECTED
HPIV4 RNA SPEC QL NAA+PROBE: NOT DETECTED
IMM GRANULOCYTES # BLD AUTO: 0 K/UL (ref 0–0.5)
IMM GRANULOCYTES NFR BLD AUTO: 0 % (ref 0–5)
KETONES UR QL STRIP.AUTO: 15 MG/DL
LEUKOCYTE ESTERASE UR QL STRIP.AUTO: NEGATIVE
LYMPHOCYTES # BLD: 3.1 K/UL (ref 0.5–4.6)
LYMPHOCYTES NFR BLD: 38 % (ref 13–44)
Lab: NORMAL
M PNEUMO DNA SPEC QL NAA+PROBE: NOT DETECTED
MCH RBC QN AUTO: 30.6 PG (ref 26.1–32.9)
MCHC RBC AUTO-ENTMCNC: 34 G/DL (ref 31.4–35)
MCV RBC AUTO: 89.9 FL (ref 82–102)
MONOCYTES # BLD: 0.5 K/UL (ref 0.1–1.3)
MONOCYTES NFR BLD: 6 % (ref 4–12)
MUCOUS THREADS URNS QL MICRO: 0 /LPF
NEGATIVE QC PASS/FAIL: NORMAL
NEUTS SEG # BLD: 4.2 K/UL (ref 1.7–8.2)
NEUTS SEG NFR BLD: 53 % (ref 43–78)
NITRITE UR QL STRIP.AUTO: NEGATIVE
NRBC # BLD: 0 K/UL (ref 0–0.2)
PH UR STRIP: 5 (ref 5–9)
PLATELET # BLD AUTO: 396 K/UL (ref 150–450)
PMV BLD AUTO: 10.3 FL (ref 9.4–12.3)
POSITIVE QC PASS/FAIL: NORMAL
POTASSIUM SERPL-SCNC: 4.1 MMOL/L (ref 3.5–5.1)
PROT SERPL-MCNC: 7.6 G/DL (ref 6.3–8.2)
PROT UR STRIP-MCNC: ABNORMAL MG/DL
RBC # BLD AUTO: 4.94 M/UL (ref 4.05–5.2)
RBC #/AREA URNS HPF: ABNORMAL /HPF
RSV RNA SPEC QL NAA+PROBE: NOT DETECTED
RV+EV RNA SPEC QL NAA+PROBE: NOT DETECTED
SARS-COV-2 RNA RESP QL NAA+PROBE: NOT DETECTED
SODIUM SERPL-SCNC: 138 MMOL/L (ref 133–143)
SP GR UR REFRACTOMETRY: 1.03 (ref 1–1.02)
URINE CULTURE IF INDICATED: ABNORMAL
UROBILINOGEN UR QL STRIP.AUTO: 0.2 EU/DL (ref 0.2–1)
WBC # BLD AUTO: 8.1 K/UL (ref 4.3–11.1)
WBC URNS QL MICRO: ABNORMAL /HPF

## 2023-08-24 PROCEDURE — 0202U NFCT DS 22 TRGT SARS-COV-2: CPT

## 2023-08-24 PROCEDURE — 6360000002 HC RX W HCPCS: Performed by: EMERGENCY MEDICINE

## 2023-08-24 PROCEDURE — 81025 URINE PREGNANCY TEST: CPT

## 2023-08-24 PROCEDURE — 96361 HYDRATE IV INFUSION ADD-ON: CPT

## 2023-08-24 PROCEDURE — 99284 EMERGENCY DEPT VISIT MOD MDM: CPT

## 2023-08-24 PROCEDURE — 80053 COMPREHEN METABOLIC PANEL: CPT

## 2023-08-24 PROCEDURE — 2580000003 HC RX 258: Performed by: EMERGENCY MEDICINE

## 2023-08-24 PROCEDURE — 85025 COMPLETE CBC W/AUTO DIFF WBC: CPT

## 2023-08-24 PROCEDURE — 96374 THER/PROPH/DIAG INJ IV PUSH: CPT

## 2023-08-24 PROCEDURE — 81001 URINALYSIS AUTO W/SCOPE: CPT

## 2023-08-24 RX ORDER — ONDANSETRON 2 MG/ML
4 INJECTION INTRAMUSCULAR; INTRAVENOUS ONCE
Status: COMPLETED | OUTPATIENT
Start: 2023-08-24 | End: 2023-08-24

## 2023-08-24 RX ORDER — 0.9 % SODIUM CHLORIDE 0.9 %
1000 INTRAVENOUS SOLUTION INTRAVENOUS ONCE
Status: COMPLETED | OUTPATIENT
Start: 2023-08-24 | End: 2023-08-24

## 2023-08-24 RX ADMIN — ONDANSETRON 4 MG: 2 INJECTION INTRAMUSCULAR; INTRAVENOUS at 18:27

## 2023-08-24 RX ADMIN — SODIUM CHLORIDE 1000 ML: 9 INJECTION, SOLUTION INTRAVENOUS at 18:27

## 2023-08-24 ASSESSMENT — PAIN - FUNCTIONAL ASSESSMENT: PAIN_FUNCTIONAL_ASSESSMENT: 0-10

## 2023-08-24 ASSESSMENT — PAIN SCALES - GENERAL
PAINLEVEL_OUTOF10: 0
PAINLEVEL_OUTOF10: 0

## 2023-08-24 NOTE — ED TRIAGE NOTES
Patient ambulatory to triage with CO fatigue, sore throat, lightheaded. Went to urgent care on 8/22 placed on amoxicillin to treat  sinus infection. Patient reports not feeling better. Reports negative flu, covid, and pending strep swab.  Reports BGL high x 2 days  (Type 2 DM) [phone]

## 2024-03-20 ENCOUNTER — APPOINTMENT (OUTPATIENT)
Dept: GENERAL RADIOLOGY | Age: 49
End: 2024-03-20
Payer: COMMERCIAL

## 2024-03-20 ENCOUNTER — HOSPITAL ENCOUNTER (EMERGENCY)
Age: 49
Discharge: HOME | End: 2024-03-20
Payer: COMMERCIAL

## 2024-03-20 VITALS
TEMPERATURE: 98.6 F | HEART RATE: 87 BPM | WEIGHT: 148 LBS | OXYGEN SATURATION: 99 % | DIASTOLIC BLOOD PRESSURE: 90 MMHG | BODY MASS INDEX: 31.07 KG/M2 | RESPIRATION RATE: 16 BRPM | SYSTOLIC BLOOD PRESSURE: 129 MMHG | HEIGHT: 58 IN

## 2024-03-20 DIAGNOSIS — U07.1 COVID: Primary | ICD-10-CM

## 2024-03-20 LAB
ALBUMIN SERPL-MCNC: 3.4 G/DL (ref 3.5–5)
ALBUMIN/GLOB SERPL: 0.8 (ref 0.4–1.6)
ALP SERPL-CCNC: 70 U/L (ref 50–136)
ALT SERPL-CCNC: 26 U/L (ref 12–65)
ANION GAP SERPL CALC-SCNC: 6 MMOL/L (ref 2–11)
AST SERPL-CCNC: 24 U/L (ref 15–37)
BASOPHILS # BLD: 0.1 K/UL (ref 0–0.2)
BASOPHILS NFR BLD: 1 % (ref 0–2)
BILIRUB SERPL-MCNC: 0.3 MG/DL (ref 0.2–1.1)
BILIRUB UR QL: NEGATIVE
BUN SERPL-MCNC: 8 MG/DL (ref 6–23)
CALCIUM SERPL-MCNC: 9 MG/DL (ref 8.3–10.4)
CHLORIDE SERPL-SCNC: 110 MMOL/L (ref 103–113)
CO2 SERPL-SCNC: 21 MMOL/L (ref 21–32)
CREAT SERPL-MCNC: 0.9 MG/DL (ref 0.6–1)
DIFFERENTIAL METHOD BLD: ABNORMAL
EOSINOPHIL # BLD: 0.2 K/UL (ref 0–0.8)
EOSINOPHIL NFR BLD: 4 % (ref 0.5–7.8)
ERYTHROCYTE [DISTWIDTH] IN BLOOD BY AUTOMATED COUNT: 12.6 % (ref 11.9–14.6)
GLOBULIN SER CALC-MCNC: 4.4 G/DL (ref 2.8–4.5)
GLUCOSE SERPL-MCNC: 161 MG/DL (ref 65–100)
GLUCOSE UR QL STRIP.AUTO: 500 MG/DL
HCG UR QL: NEGATIVE
HCT VFR BLD AUTO: 45.9 % (ref 35.8–46.3)
HGB BLD-MCNC: 15.2 G/DL (ref 11.7–15.4)
IMM GRANULOCYTES # BLD AUTO: 0 K/UL (ref 0–0.5)
IMM GRANULOCYTES NFR BLD AUTO: 0 % (ref 0–5)
KETONES UR-MCNC: NEGATIVE MG/DL
LEUKOCYTE ESTERASE UR QL STRIP: NEGATIVE
LIPASE SERPL-CCNC: 105 U/L (ref 73–393)
LYMPHOCYTES # BLD: 1.7 K/UL (ref 0.5–4.6)
LYMPHOCYTES NFR BLD: 31 % (ref 13–44)
MAGNESIUM SERPL-MCNC: 2 MG/DL (ref 1.8–2.4)
MCH RBC QN AUTO: 28.4 PG (ref 26.1–32.9)
MCHC RBC AUTO-ENTMCNC: 33.1 G/DL (ref 31.4–35)
MCV RBC AUTO: 85.8 FL (ref 82–102)
MONOCYTES # BLD: 0.6 K/UL (ref 0.1–1.3)
MONOCYTES NFR BLD: 11 % (ref 4–12)
NEUTS SEG # BLD: 2.9 K/UL (ref 1.7–8.2)
NEUTS SEG NFR BLD: 53 % (ref 43–78)
NITRITE UR QL: NEGATIVE
NRBC # BLD: 0 K/UL (ref 0–0.2)
PH UR: 6 (ref 5–9)
PLATELET # BLD AUTO: 336 K/UL (ref 150–450)
PMV BLD AUTO: 9 FL (ref 9.4–12.3)
POTASSIUM SERPL-SCNC: 4.1 MMOL/L (ref 3.5–5.1)
PROT SERPL-MCNC: 7.8 G/DL (ref 6.3–8.2)
PROT UR QL: NEGATIVE MG/DL
RBC # BLD AUTO: 5.35 M/UL (ref 4.05–5.2)
RBC # UR STRIP: ABNORMAL
SERVICE CMNT-IMP: ABNORMAL
SODIUM SERPL-SCNC: 137 MMOL/L (ref 136–146)
SP GR UR: >1.03 (ref 1–1.02)
STREP, MOLECULAR: NOT DETECTED
UROBILINOGEN UR QL: 0.2 EU/DL (ref 0.2–1)
WBC # BLD AUTO: 5.6 K/UL (ref 4.3–11.1)

## 2024-03-20 PROCEDURE — 85025 COMPLETE CBC W/AUTO DIFF WBC: CPT

## 2024-03-20 PROCEDURE — 6360000002 HC RX W HCPCS

## 2024-03-20 PROCEDURE — 80053 COMPREHEN METABOLIC PANEL: CPT

## 2024-03-20 PROCEDURE — 71046 X-RAY EXAM CHEST 2 VIEWS: CPT

## 2024-03-20 PROCEDURE — 87651 STREP A DNA AMP PROBE: CPT

## 2024-03-20 PROCEDURE — 81003 URINALYSIS AUTO W/O SCOPE: CPT

## 2024-03-20 PROCEDURE — 96375 TX/PRO/DX INJ NEW DRUG ADDON: CPT

## 2024-03-20 PROCEDURE — 2580000003 HC RX 258

## 2024-03-20 PROCEDURE — 81025 URINE PREGNANCY TEST: CPT

## 2024-03-20 PROCEDURE — 96374 THER/PROPH/DIAG INJ IV PUSH: CPT

## 2024-03-20 PROCEDURE — 96361 HYDRATE IV INFUSION ADD-ON: CPT

## 2024-03-20 PROCEDURE — 99284 EMERGENCY DEPT VISIT MOD MDM: CPT

## 2024-03-20 PROCEDURE — 83690 ASSAY OF LIPASE: CPT

## 2024-03-20 PROCEDURE — 83735 ASSAY OF MAGNESIUM: CPT

## 2024-03-20 RX ORDER — 0.9 % SODIUM CHLORIDE 0.9 %
500 INTRAVENOUS SOLUTION INTRAVENOUS
Status: COMPLETED | OUTPATIENT
Start: 2024-03-20 | End: 2024-03-20

## 2024-03-20 RX ORDER — ONDANSETRON 2 MG/ML
4 INJECTION INTRAMUSCULAR; INTRAVENOUS
Status: COMPLETED | OUTPATIENT
Start: 2024-03-20 | End: 2024-03-20

## 2024-03-20 RX ORDER — KETOROLAC TROMETHAMINE 15 MG/ML
15 INJECTION, SOLUTION INTRAMUSCULAR; INTRAVENOUS ONCE
Status: COMPLETED | OUTPATIENT
Start: 2024-03-20 | End: 2024-03-20

## 2024-03-20 RX ORDER — ONDANSETRON 4 MG/1
4 TABLET, ORALLY DISINTEGRATING ORAL 3 TIMES DAILY PRN
Qty: 21 TABLET | Refills: 0 | Status: SHIPPED | OUTPATIENT
Start: 2024-03-20

## 2024-03-20 RX ADMIN — SODIUM CHLORIDE 500 ML: 9 INJECTION, SOLUTION INTRAVENOUS at 13:14

## 2024-03-20 RX ADMIN — KETOROLAC TROMETHAMINE 15 MG: 15 INJECTION, SOLUTION INTRAMUSCULAR; INTRAVENOUS at 13:15

## 2024-03-20 RX ADMIN — ONDANSETRON 4 MG: 2 INJECTION INTRAMUSCULAR; INTRAVENOUS at 13:15

## 2024-03-20 ASSESSMENT — ENCOUNTER SYMPTOMS
NAUSEA: 1
BACK PAIN: 0
DIARRHEA: 0
SHORTNESS OF BREATH: 0
ABDOMINAL PAIN: 0
VOICE CHANGE: 0
CONSTIPATION: 0
WHEEZING: 0
COUGH: 1
SINUS PRESSURE: 0
SORE THROAT: 1
TROUBLE SWALLOWING: 0

## 2024-03-20 ASSESSMENT — LIFESTYLE VARIABLES
HOW MANY STANDARD DRINKS CONTAINING ALCOHOL DO YOU HAVE ON A TYPICAL DAY: PATIENT DOES NOT DRINK
HOW OFTEN DO YOU HAVE A DRINK CONTAINING ALCOHOL: NEVER

## 2024-03-20 ASSESSMENT — PAIN SCALES - GENERAL
PAINLEVEL_OUTOF10: 4
PAINLEVEL_OUTOF10: 4

## 2024-03-20 ASSESSMENT — PAIN DESCRIPTION - DESCRIPTORS: DESCRIPTORS: ACHING

## 2024-03-20 ASSESSMENT — PAIN DESCRIPTION - LOCATION
LOCATION: GENERALIZED
LOCATION: GENERALIZED

## 2024-03-20 NOTE — ED PROVIDER NOTES
Emergency Department Provider Note       PCP: Chuyita Roche APRN - NP   Age: 48 y.o.   Sex: female     DISPOSITION Decision To Discharge 03/20/2024 02:26:39 PM       ICD-10-CM    1. COVID  U07.1           Medical Decision Making     Patient is a 48-year-old female with past medical history of type 2 diabetes and hypertension presents complaining of sore throat, cough, nausea, and bodyaches.  Patient tested positive for COVID at home 4 days ago and has had continued symptoms.     On presentation, patient is afebrile, vital signs are stable, and she is very well-appearing in no acute distress.  Her lungs are clear to auscultation in all fields without crackles, wheezes, or other adventitious sounds, O2 saturation is 99% on room air.  Posterior oropharynx is patent without any tonsillar edema, exudate, uvular deviation, or peritonsillar abscess.  She has no nuchal rigidity or meningeal signs.    CBC with stable H&H no evidence of leukocytosis.  CMP with stable electrolytes, kidney, and hepatic function.  Magnesium and lipase are normal.  Chest x-ray does not reveal any evidence of pneumonia.  Reveals some mild right basilar atelectasis.  Urinalysis reveals some evidence of dehydration, patient was given fluids, Toradol, and Zofran with improvement in her symptoms.    Due to stable vital signs, no evidence of hypoxia, pneumonia, excetra plan for this patient as continued outpatient management.  Given patient's comorbidities of hypertension and type 2 diabetes we will begin her on Paxlovid.  She is no longer on the statin previously prescribed and all of her other medications were checked with the interaction  and were compatible with Paxlovid.  We discussed the risks and benefits and patient was agreeable with this medication.  Will also prescribe her Zofran to begin taking for nausea.  She will continue ibuprofen and Tylenol at home for fevers and bodyaches.  Will continue to stay hydrated and

## 2024-03-20 NOTE — ED NOTES
I have reviewed discharge instructions with the patient.  The patient verbalized understanding.    Patient left ED via Discharge Method: ambulatory to Home with (SELF).    Opportunity for questions and clarification provided.       Patient given 2 scripts.         To continue your aftercare when you leave the hospital, you may receive an automated call from our care team to check in on how you are doing.  This is a free service and part of our promise to provide the best care and service to meet your aftercare needs.\" If you have questions, or wish to unsubscribe from this service please call [phone].  Thank you for Choosing our [facility] Emergency Department.        Erwin Diggs, RN  03/20/24 8501

## 2024-03-20 NOTE — ED TRIAGE NOTES
Patient arrives to ED pov from home. Patient reports she is Covid positive as of Sunday. Patient reports fever, sore throat, and vomiting.

## 2024-03-20 NOTE — DISCHARGE INSTRUCTIONS
Please begin taking the Paxlovid as prescribed.  You can also take the Zofran as needed for nausea.  Continue ibuprofen and Tylenol for fevers and bodyaches.  Continue to stay hydrated and push fluids.  Please stop taking your lovastatin/Mevacor while taking the Paxlovid and hold for 5 days after you finish the course of Paxlovid.  Please follow-up with your primary care provider to ensure improvement in your symptoms.  Return to the ED immediately if you begin to experience any shortness of breath, high fevers, or any new or worsening symptoms.